# Patient Record
Sex: FEMALE | Race: WHITE | NOT HISPANIC OR LATINO | Employment: OTHER | URBAN - METROPOLITAN AREA
[De-identification: names, ages, dates, MRNs, and addresses within clinical notes are randomized per-mention and may not be internally consistent; named-entity substitution may affect disease eponyms.]

---

## 2023-10-18 ENCOUNTER — APPOINTMENT (EMERGENCY)
Dept: RADIOLOGY | Facility: HOSPITAL | Age: 63
DRG: 419 | End: 2023-10-18
Payer: COMMERCIAL

## 2023-10-18 ENCOUNTER — HOSPITAL ENCOUNTER (INPATIENT)
Facility: HOSPITAL | Age: 63
LOS: 2 days | Discharge: HOME | DRG: 419 | End: 2023-10-20
Attending: EMERGENCY MEDICINE | Admitting: SURGERY
Payer: COMMERCIAL

## 2023-10-18 DIAGNOSIS — K80.00 ACUTE CALCULOUS CHOLECYSTITIS: ICD-10-CM

## 2023-10-18 DIAGNOSIS — R10.11 ACUTE ABDOMINAL PAIN IN RIGHT UPPER QUADRANT: Primary | ICD-10-CM

## 2023-10-18 DIAGNOSIS — K80.20 CHOLELITHIASIS: ICD-10-CM

## 2023-10-18 LAB
2HR DELTA HS TROPONIN: -1 NG/L
ALBUMIN SERPL BCP-MCNC: 4.5 G/DL (ref 3.5–5)
ALP SERPL-CCNC: 99 U/L (ref 34–104)
ALT SERPL W P-5'-P-CCNC: 13 U/L (ref 7–52)
ANION GAP SERPL CALCULATED.3IONS-SCNC: 6 MMOL/L
AST SERPL W P-5'-P-CCNC: 13 U/L (ref 13–39)
ATRIAL RATE: 61 BPM
BACTERIA UR QL AUTO: ABNORMAL /HPF
BASOPHILS # BLD AUTO: 0.03 THOUSANDS/ÂΜL (ref 0–0.1)
BASOPHILS NFR BLD AUTO: 0 % (ref 0–1)
BILIRUB DIRECT SERPL-MCNC: 0.07 MG/DL (ref 0–0.2)
BILIRUB SERPL-MCNC: 0.44 MG/DL (ref 0.2–1)
BILIRUB UR QL STRIP: NEGATIVE
BUN SERPL-MCNC: 13 MG/DL (ref 5–25)
CALCIUM SERPL-MCNC: 9.9 MG/DL (ref 8.4–10.2)
CARDIAC TROPONIN I PNL SERPL HS: 2 NG/L
CARDIAC TROPONIN I PNL SERPL HS: 3 NG/L
CHLORIDE SERPL-SCNC: 107 MMOL/L (ref 96–108)
CLARITY UR: CLEAR
CO2 SERPL-SCNC: 27 MMOL/L (ref 21–32)
COLOR UR: YELLOW
CREAT SERPL-MCNC: 0.75 MG/DL (ref 0.6–1.3)
EOSINOPHIL # BLD AUTO: 0.11 THOUSAND/ÂΜL (ref 0–0.61)
EOSINOPHIL NFR BLD AUTO: 2 % (ref 0–6)
ERYTHROCYTE [DISTWIDTH] IN BLOOD BY AUTOMATED COUNT: 14 % (ref 11.6–15.1)
GFR SERPL CREATININE-BSD FRML MDRD: 85 ML/MIN/1.73SQ M
GLUCOSE SERPL-MCNC: 128 MG/DL (ref 65–140)
GLUCOSE UR STRIP-MCNC: NEGATIVE MG/DL
HCT VFR BLD AUTO: 39.1 % (ref 34.8–46.1)
HGB BLD-MCNC: 13.2 G/DL (ref 11.5–15.4)
HGB UR QL STRIP.AUTO: ABNORMAL
IMM GRANULOCYTES # BLD AUTO: 0.01 THOUSAND/UL (ref 0–0.2)
IMM GRANULOCYTES NFR BLD AUTO: 0 % (ref 0–2)
KETONES UR STRIP-MCNC: NEGATIVE MG/DL
LEUKOCYTE ESTERASE UR QL STRIP: ABNORMAL
LIPASE SERPL-CCNC: 31 U/L (ref 11–82)
LYMPHOCYTES # BLD AUTO: 1.27 THOUSANDS/ÂΜL (ref 0.6–4.47)
LYMPHOCYTES NFR BLD AUTO: 19 % (ref 14–44)
MCH RBC QN AUTO: 33.3 PG (ref 26.8–34.3)
MCHC RBC AUTO-ENTMCNC: 33.8 G/DL (ref 31.4–37.4)
MCV RBC AUTO: 99 FL (ref 82–98)
MONOCYTES # BLD AUTO: 0.45 THOUSAND/ÂΜL (ref 0.17–1.22)
MONOCYTES NFR BLD AUTO: 7 % (ref 4–12)
MUCOUS THREADS UR QL AUTO: ABNORMAL
NEUTROPHILS # BLD AUTO: 4.85 THOUSANDS/ÂΜL (ref 1.85–7.62)
NEUTS SEG NFR BLD AUTO: 72 % (ref 43–75)
NITRITE UR QL STRIP: NEGATIVE
NON-SQ EPI CELLS URNS QL MICRO: ABNORMAL /HPF
NRBC BLD AUTO-RTO: 0 /100 WBCS
P AXIS: 39 DEGREES
PH UR STRIP.AUTO: 6 [PH] (ref 4.5–8)
PLATELET # BLD AUTO: 200 THOUSANDS/UL (ref 149–390)
PMV BLD AUTO: 10.9 FL (ref 8.9–12.7)
POTASSIUM SERPL-SCNC: 4.2 MMOL/L (ref 3.5–5.3)
PR INTERVAL: 138 MS
PROT SERPL-MCNC: 7.4 G/DL (ref 6.4–8.4)
PROT UR STRIP-MCNC: NEGATIVE MG/DL
QRS AXIS: 18 DEGREES
QRSD INTERVAL: 84 MS
QT INTERVAL: 434 MS
QTC INTERVAL: 436 MS
RBC # BLD AUTO: 3.96 MILLION/UL (ref 3.81–5.12)
RBC #/AREA URNS AUTO: ABNORMAL /HPF
SODIUM SERPL-SCNC: 140 MMOL/L (ref 135–147)
SP GR UR STRIP.AUTO: >=1.03 (ref 1–1.03)
T WAVE AXIS: 27 DEGREES
UROBILINOGEN UR QL STRIP.AUTO: 0.2 E.U./DL
VENTRICULAR RATE: 61 BPM
WBC # BLD AUTO: 6.72 THOUSAND/UL (ref 4.31–10.16)
WBC #/AREA URNS AUTO: ABNORMAL /HPF

## 2023-10-18 PROCEDURE — 93010 ELECTROCARDIOGRAM REPORT: CPT | Performed by: INTERNAL MEDICINE

## 2023-10-18 PROCEDURE — 83690 ASSAY OF LIPASE: CPT

## 2023-10-18 PROCEDURE — 96375 TX/PRO/DX INJ NEW DRUG ADDON: CPT

## 2023-10-18 PROCEDURE — 99223 1ST HOSP IP/OBS HIGH 75: CPT | Performed by: SURGERY

## 2023-10-18 PROCEDURE — 96361 HYDRATE IV INFUSION ADD-ON: CPT

## 2023-10-18 PROCEDURE — 71046 X-RAY EXAM CHEST 2 VIEWS: CPT

## 2023-10-18 PROCEDURE — 99285 EMERGENCY DEPT VISIT HI MDM: CPT | Performed by: EMERGENCY MEDICINE

## 2023-10-18 PROCEDURE — G1004 CDSM NDSC: HCPCS

## 2023-10-18 PROCEDURE — 99285 EMERGENCY DEPT VISIT HI MDM: CPT

## 2023-10-18 PROCEDURE — 74177 CT ABD & PELVIS W/CONTRAST: CPT

## 2023-10-18 PROCEDURE — 84484 ASSAY OF TROPONIN QUANT: CPT

## 2023-10-18 PROCEDURE — 96374 THER/PROPH/DIAG INJ IV PUSH: CPT

## 2023-10-18 PROCEDURE — 81001 URINALYSIS AUTO W/SCOPE: CPT

## 2023-10-18 PROCEDURE — 36415 COLL VENOUS BLD VENIPUNCTURE: CPT

## 2023-10-18 PROCEDURE — 80076 HEPATIC FUNCTION PANEL: CPT

## 2023-10-18 PROCEDURE — 80048 BASIC METABOLIC PNL TOTAL CA: CPT

## 2023-10-18 PROCEDURE — 85025 COMPLETE CBC W/AUTO DIFF WBC: CPT

## 2023-10-18 PROCEDURE — 93005 ELECTROCARDIOGRAM TRACING: CPT

## 2023-10-18 RX ORDER — FAMOTIDINE 20 MG/1
20 TABLET, FILM COATED ORAL DAILY
Status: DISCONTINUED | OUTPATIENT
Start: 2023-10-19 | End: 2023-10-20 | Stop reason: HOSPADM

## 2023-10-18 RX ORDER — ONDANSETRON 2 MG/ML
4 INJECTION INTRAMUSCULAR; INTRAVENOUS ONCE
Status: COMPLETED | OUTPATIENT
Start: 2023-10-18 | End: 2023-10-18

## 2023-10-18 RX ORDER — OXYCODONE HYDROCHLORIDE 10 MG/1
10 TABLET ORAL EVERY 4 HOURS PRN
Status: DISCONTINUED | OUTPATIENT
Start: 2023-10-18 | End: 2023-10-20 | Stop reason: HOSPADM

## 2023-10-18 RX ORDER — AMITRIPTYLINE HYDROCHLORIDE 10 MG/1
10 TABLET, FILM COATED ORAL
Status: DISCONTINUED | OUTPATIENT
Start: 2023-10-18 | End: 2023-10-20 | Stop reason: HOSPADM

## 2023-10-18 RX ORDER — HYDROMORPHONE HCL/PF 1 MG/ML
0.5 SYRINGE (ML) INJECTION ONCE
Status: COMPLETED | OUTPATIENT
Start: 2023-10-18 | End: 2023-10-18

## 2023-10-18 RX ORDER — ACETAMINOPHEN 325 MG/1
650 TABLET ORAL EVERY 6 HOURS PRN
Status: DISCONTINUED | OUTPATIENT
Start: 2023-10-18 | End: 2023-10-20 | Stop reason: HOSPADM

## 2023-10-18 RX ORDER — SODIUM CHLORIDE, SODIUM GLUCONATE, SODIUM ACETATE, POTASSIUM CHLORIDE, MAGNESIUM CHLORIDE, SODIUM PHOSPHATE, DIBASIC, AND POTASSIUM PHOSPHATE .53; .5; .37; .037; .03; .012; .00082 G/100ML; G/100ML; G/100ML; G/100ML; G/100ML; G/100ML; G/100ML
75 INJECTION, SOLUTION INTRAVENOUS CONTINUOUS
Status: DISCONTINUED | OUTPATIENT
Start: 2023-10-19 | End: 2023-10-20

## 2023-10-18 RX ORDER — HYDROMORPHONE HCL/PF 1 MG/ML
0.5 SYRINGE (ML) INJECTION EVERY 4 HOURS PRN
Status: DISCONTINUED | OUTPATIENT
Start: 2023-10-18 | End: 2023-10-20

## 2023-10-18 RX ORDER — ESCITALOPRAM OXALATE 20 MG/1
20 TABLET ORAL DAILY
COMMUNITY

## 2023-10-18 RX ORDER — CLINDAMYCIN PHOSPHATE 600 MG/50ML
600 INJECTION INTRAVENOUS EVERY 8 HOURS
Status: DISCONTINUED | OUTPATIENT
Start: 2023-10-18 | End: 2023-10-19

## 2023-10-18 RX ORDER — KETOROLAC TROMETHAMINE 30 MG/ML
15 INJECTION, SOLUTION INTRAMUSCULAR; INTRAVENOUS ONCE
Status: COMPLETED | OUTPATIENT
Start: 2023-10-18 | End: 2023-10-18

## 2023-10-18 RX ORDER — ROSUVASTATIN CALCIUM 20 MG/1
20 TABLET, COATED ORAL DAILY
COMMUNITY

## 2023-10-18 RX ORDER — FAMOTIDINE 20 MG/1
20 TABLET, FILM COATED ORAL DAILY
COMMUNITY

## 2023-10-18 RX ORDER — ESCITALOPRAM OXALATE 20 MG/1
20 TABLET ORAL DAILY
Status: DISCONTINUED | OUTPATIENT
Start: 2023-10-19 | End: 2023-10-20 | Stop reason: HOSPADM

## 2023-10-18 RX ORDER — AMITRIPTYLINE HYDROCHLORIDE 10 MG/1
10 TABLET, FILM COATED ORAL
COMMUNITY

## 2023-10-18 RX ORDER — LISINOPRIL 30 MG/1
30 TABLET ORAL DAILY
COMMUNITY

## 2023-10-18 RX ORDER — METOPROLOL SUCCINATE 50 MG/1
50 TABLET, EXTENDED RELEASE ORAL DAILY
COMMUNITY

## 2023-10-18 RX ORDER — METOPROLOL SUCCINATE 50 MG/1
50 TABLET, EXTENDED RELEASE ORAL DAILY
Status: DISCONTINUED | OUTPATIENT
Start: 2023-10-19 | End: 2023-10-20 | Stop reason: HOSPADM

## 2023-10-18 RX ORDER — ONDANSETRON 2 MG/ML
4 INJECTION INTRAMUSCULAR; INTRAVENOUS EVERY 6 HOURS PRN
Status: DISCONTINUED | OUTPATIENT
Start: 2023-10-18 | End: 2023-10-20

## 2023-10-18 RX ORDER — OXYCODONE HYDROCHLORIDE 5 MG/1
5 TABLET ORAL EVERY 4 HOURS PRN
Status: DISCONTINUED | OUTPATIENT
Start: 2023-10-18 | End: 2023-10-20 | Stop reason: HOSPADM

## 2023-10-18 RX ORDER — HEPARIN SODIUM 5000 [USP'U]/ML
5000 INJECTION, SOLUTION INTRAVENOUS; SUBCUTANEOUS EVERY 8 HOURS SCHEDULED
Status: DISCONTINUED | OUTPATIENT
Start: 2023-10-18 | End: 2023-10-20 | Stop reason: HOSPADM

## 2023-10-18 RX ORDER — METOCLOPRAMIDE HYDROCHLORIDE 5 MG/ML
10 INJECTION INTRAMUSCULAR; INTRAVENOUS ONCE
Status: COMPLETED | OUTPATIENT
Start: 2023-10-18 | End: 2023-10-18

## 2023-10-18 RX ADMIN — HEPARIN SODIUM 5000 UNITS: 5000 INJECTION INTRAVENOUS; SUBCUTANEOUS at 16:54

## 2023-10-18 RX ADMIN — CLINDAMYCIN PHOSPHATE 600 MG: 600 INJECTION, SOLUTION INTRAVENOUS at 21:11

## 2023-10-18 RX ADMIN — ONDANSETRON 4 MG: 2 INJECTION INTRAMUSCULAR; INTRAVENOUS at 11:35

## 2023-10-18 RX ADMIN — KETOROLAC TROMETHAMINE 15 MG: 30 INJECTION, SOLUTION INTRAMUSCULAR; INTRAVENOUS at 11:31

## 2023-10-18 RX ADMIN — AMITRIPTYLINE HYDROCHLORIDE 10 MG: 10 TABLET, FILM COATED ORAL at 21:51

## 2023-10-18 RX ADMIN — HYDROMORPHONE HYDROCHLORIDE 0.5 MG: 1 INJECTION, SOLUTION INTRAMUSCULAR; INTRAVENOUS; SUBCUTANEOUS at 14:42

## 2023-10-18 RX ADMIN — SODIUM CHLORIDE 1000 ML: 0.9 INJECTION, SOLUTION INTRAVENOUS at 11:22

## 2023-10-18 RX ADMIN — IOHEXOL 50 ML: 240 INJECTION, SOLUTION INTRATHECAL; INTRAVASCULAR; INTRAVENOUS; ORAL at 12:05

## 2023-10-18 RX ADMIN — METOCLOPRAMIDE HYDROCHLORIDE 10 MG: 5 INJECTION INTRAMUSCULAR; INTRAVENOUS at 15:06

## 2023-10-18 RX ADMIN — IOHEXOL 100 ML: 350 INJECTION, SOLUTION INTRAVENOUS at 13:49

## 2023-10-18 NOTE — ED ATTENDING ATTESTATION
10/18/2023  Caity Anthony DO, saw and evaluated the patient. I have discussed the patient with the resident/non-physician practitioner and agree with the resident's/non-physician practitioner's findings, Plan of Care, and MDM as documented in the resident's/non-physician practitioner's note, except where noted. All available labs and Radiology studies were reviewed. I was present for key portions of any procedure(s) performed by the resident/non-physician practitioner and I was immediately available to provide assistance. At this point I agree with the current assessment done in the Emergency Department. I have conducted an independent evaluation of this patient a history and physical is as follows:    23-year-old female presents for complaint of right upper quadrant abdominal pain associated with right scapular pain and nausea. Patient has had symptoms intermittently for weeks for which she was being evaluated by a surgeon at [facility].  She has been diagnosed with cholelithiasis and was being scheduled for surgery. However, symptoms worsened and are now constant. She was seen at [facility] yesterday and had a confirming right upper quadrant ultrasound demonstrating cholelithiasis without surrounding fluid. However, she was diagnosed with shoulder musculoskeletal spasm and given Valium which she states has only made her feel loopy. No hematochezia, melena or hematemesis. No change in symptoms w/BM or voiding. No recent travel or similar sick contacts. Denies f/c, CP, SOB, v/d. 12 system ROS o/w negative. PE: NAD, appears comfortable, alert; PERRL, EOMI; MMM, no posterior oropharyngeal exudate, edema or erythema; HRR, no murmur; lungs CTA w/o w/r/r, POx 96% on RA (nl); abdomen s/nd, TTP in RUQ without r/g/r, (-) Rovsing's, nl BS in all 4 quadrant; (-) LE edema or calf TTP, FROM extremities x4; skin p/w/d; CNs GI/NF, oriented.     MDM/DDx: RUQ abdominal pain/R shoulder pain - symptomatic cholelithiasis, no clinical symptoms of acute cholecystitis, doubt other GI cause, atypical cardiac cause or pulmonary cause. I independently reviewed and interpreted ordered labs from this encounter. A/P: Will check CT a/p, abdominal labs, treat symptoms, reevaluate for further work up and disposition.     ED Course         Critical Care Time  Procedures

## 2023-10-18 NOTE — H&P
H&P - Red Surgery  : Red [facility] Resident [facility]  Carmita Payne 59 y.o. female MRN: [id number]  Unit/Bed#: ED 05 Encounter: [id number]    Assessment:  59 y.o. female with symptomatic cholelithiasis, acute cholecystitis    Plan:  - Admit to general surgery service  - NPO at midnight  - Laparoscopic cholecystectomy 10/19  - IV antibiotics, discussion pending with pharmacy   - Pain and nausea control PRN  - DVT ppx    HPI:  Maycol Lechuga is a 59 y.o. female with a history of known cholelithiasis, colectomy 2012 for diverticulitis, hypertension, hyperlipidemia, IBS, migraine, LANETTE. Patient scheduled for elective cholecystectomy in November at [facility]. Presented to the ED after 1 week of worsening right upper quadrant and right shoulder pain. First episode was reportedly 2 years ago, only occurring 4 times since then. Pain has been intermittent and the past 6 months, but constant and worsening in the past week. Reports decreased appetite, nausea, episodes of diarrhea, subjective fever, chills, shortness of breath with pain, increased blood pressure above baseline. Denies chest pain or vomiting. Seen yesterday at [facility] ED, at which time right upper quadrant ultrasound was performed, which demonstrated cholelithiasis without pericholecystic edema. Patient was prescribed Valium for possible musculoskeletal pain. On presentation to ED today, hypertensive to 170s over 90s, vitals otherwise within normal limits. Labs unremarkable. CT abdomen pelvis performed demonstrating cholelithiasis with likely mild acute cholecystitis. Physical Exam  Vitals reviewed. Constitutional:       General: She is not in acute distress. Appearance: Normal appearance. She is not ill-appearing or toxic-appearing. HENT:      Head: Normocephalic and atraumatic. Mouth/Throat:      Mouth: Mucous membranes are moist.   Eyes:      Extraocular Movements: Extraocular movements intact. Conjunctiva/sclera: Conjunctivae normal.   Cardiovascular:      Rate and Rhythm: Normal rate. Pulmonary:      Effort: Pulmonary effort is normal. No respiratory distress. Abdominal:      General: There is no distension. Palpations: Abdomen is soft. Tenderness: There is abdominal tenderness (RUQ, right flank). There is no guarding or rebound. Musculoskeletal:      Right lower leg: No edema. Left lower leg: No edema. Skin:     General: Skin is warm and dry. Neurological:      Mental Status: She is alert and oriented to person, place, and time. Review of Systems   Constitutional:  Positive for appetite change, chills and fever. Respiratory:  Negative for shortness of breath. Cardiovascular:  Negative for chest pain. Gastrointestinal:  Positive for abdominal pain, diarrhea and nausea. Negative for abdominal distention and vomiting. Genitourinary:  Negative for difficulty urinating. Musculoskeletal:  Positive for arthralgias (s/p bilateral knee replacements). Allergic/Immunologic: Positive for environmental allergies. Neurological:  Positive for headaches. Psychiatric/Behavioral: Negative.      Denies additional complaints    Objective       No intake or output data in the 24 hours ending 10/18/23 1452    First Vitals:   Blood Pressure: 151/97 (10/18/23 1050)  Pulse: 68 (10/18/23 1050)  Temperature: 97.7 °F (36.5 °C) (10/18/23 1050)  Temp Source: Temporal (10/18/23 1050)  Respirations: 18 (10/18/23 1050)  SpO2: 98 % (10/18/23 1050)    Current Vitals:   Blood Pressure: (!) 175/81 (10/18/23 1300)  Pulse: 60 (10/18/23 1300)  Temperature: 97.7 °F (36.5 °C) (10/18/23 1050)  Temp Source: Temporal (10/18/23 1050)  Respirations: 18 (10/18/23 1300)  SpO2: 96 % (10/18/23 1300)    Invasive Devices       Peripheral Intravenous Line  Duration             Peripheral IV 10/18/23 Proximal;Right;Ventral (anterior) Forearm <1 day                    Imaging: I have personally reviewed pertinent reports. CT abdomen pelvis with contrast    Result Date: 10/18/2023  Impression: 1. Cholelithiasis with likely mild acute cholecystitis given acute right upper quadrant pain and nausea. 2.  Moderate hiatal hernia. 3.  Indeterminate 1.2 cm right adrenal nodule. Although its imaging features are indeterminate, it does not have suspicious imaging features (heterogeneity, necrosis, irregular margins), therefore this is likely benign, and can be followed by non-contrast abdomen CT or MRI in 12 months. Please note that for adrenal nodule > 1cm,  biochemical evaluation is suggested to rule out functioning adenoma. 4.  2.9 cm cyst in the left ovary with lobulated borders. A follow-up nonemergent ultrasound is recommended given postmenopausal status. Adrenal recommendation based on institutional consensus and Journal of [address] [place] [address] of Radiology 2017;[id number] The study was marked in [facility] for immediate notification. Workstation performed: [id number]       EKG, Pathology, and Other Studies: I have personally reviewed pertinent reports. VTE Pharmacologic Prophylaxis: Heparin  VTE Mechanical Prophylaxis: sequential compression device    Historical Information   Past Medical History:   Diagnosis Date    Gallstones     High cholesterol     Hypertension     IBS (irritable bowel syndrome)     Migraine     Skin cancer     Sleep apnea      History reviewed. No pertinent surgical history. Social History   Social History     Substance and Sexual Activity   Alcohol Use None     Social History     Substance and Sexual Activity   Drug Use Not on file     Social History     Tobacco Use   Smoking Status Not on file   Smokeless Tobacco Not on file     History reviewed. No pertinent family history. Meds/Allergies   all current active meds have been reviewed, current meds:   No current facility-administered medications for this encounter.     and PTA meds:   None     Allergies   Allergen Reactions Cephalosporins Anaphylaxis    Penicillins Other (See Comments)     unknown    Bactrim [Sulfamethoxazole-Trimethoprim] Rash    Sulfa Antibiotics Rash       Lab Results: I have personally reviewed pertinent lab results. , CBC:   Lab Results   Component Value Date    WBC 6.72 10/18/2023    HGB 13.2 10/18/2023    HCT 39.1 10/18/2023    MCV 99 (H) 10/18/2023     10/18/2023    RBC 3.96 10/18/2023    MCH 33.3 10/18/2023    MCHC 33.8 10/18/2023    RDW 14.0 10/18/2023    MPV 10.9 10/18/2023    NRBC 0 10/18/2023   , CMP:   Lab Results   Component Value Date    SODIUM 140 10/18/2023    K 4.2 10/18/2023     10/18/2023    CO2 27 10/18/2023    BUN 13 10/18/2023    CREATININE 0.75 10/18/2023    CALCIUM 9.9 10/18/2023    AST 13 10/18/2023    ALT 13 10/18/2023    ALKPHOS 99 10/18/2023    EGFR 85 10/18/2023       Counseling / Coordination of Care  Total floor / unit time spent today 25 minutes. Greater than 50% of total time was spent with the patient and / or family counseling and / or coordination of care.     Consults    ---  Leena Castillo MD  General Surgery PGY-I

## 2023-10-18 NOTE — PLAN OF CARE
Problem: PAIN - ADULT  Goal: Verbalizes/displays adequate comfort level or baseline comfort level  Description: Interventions:  - Encourage patient to monitor pain and request assistance  - Assess pain using appropriate pain scale  - Administer analgesics based on type and severity of pain and evaluate response  - Implement non-pharmacological measures as appropriate and evaluate response  - Consider cultural and social influences on pain and pain management  - Notify physician/advanced practitioner if interventions unsuccessful or patient reports new pain  Outcome: Progressing     Problem: INFECTION - ADULT  Goal: Absence or prevention of progression during hospitalization  Description: INTERVENTIONS:  - Assess and monitor for signs and symptoms of infection  - Monitor lab/diagnostic results  - Monitor all insertion sites, i.e. indwelling lines, tubes, and drains  - Monitor endotracheal if appropriate and nasal secretions for changes in amount and color  - [place] appropriate cooling/warming therapies per order  - Administer medications as ordered  - Instruct and encourage patient and family to use good hand hygiene technique  - Identify and instruct in appropriate isolation precautions for identified infection/condition  Outcome: Progressing  Goal: Absence of fever/infection during neutropenic period  Description: INTERVENTIONS:  - Monitor WBC    Outcome: Progressing     Problem: SAFETY ADULT  Goal: Patient will remain free of falls  Description: INTERVENTIONS:  - Educate patient/family on patient safety including physical limitations  - Instruct patient to call for assistance with activity   - Consult OT/PT to assist with strengthening/mobility   - Keep Call bell within reach  - Keep bed low and locked with side rails adjusted as appropriate  - Keep care items and personal belongings within reach  - Initiate and maintain comfort rounds  - Make Fall Risk Sign visible to staff  - Offer Toileting every 2 Hours, in advance of need  - Initiate/Maintain alarm  - Obtain necessary fall risk management equipment  - Apply yellow socks and bracelet for high fall risk patients  - Consider moving patient to room near nurses station  Outcome: Progressing  Goal: Maintain or return to baseline ADL function  Description: INTERVENTIONS:  -  Assess patient's ability to carry out ADLs; assess patient's baseline for ADL function and identify physical deficits which impact ability to perform ADLs (bathing, care of mouth/teeth, toileting, grooming, dressing, etc.)  - Assess/evaluate cause of self-care deficits   - Assess range of motion  - Assess patient's mobility; develop plan if impaired  - Assess patient's need for assistive devices and provide as appropriate  - Encourage maximum independence but intervene and supervise when necessary  - Involve family in performance of ADLs  - Assess for home care needs following discharge   - Consider OT consult to assist with ADL evaluation and planning for discharge  - Provide patient education as appropriate  Outcome: Progressing  Goal: Maintains/Returns to pre admission functional level  Description: INTERVENTIONS:  - Perform BMAT or MOVE assessment daily.   - Set and communicate daily mobility goal to care team and patient/family/caregiver. - Collaborate with rehabilitation services on mobility goals if consulted  - Perform Range of Motion 2 times a day. - Reposition patient every 2 hours.   - Dangle patient 2 times a day  - Stand patient 2 times a day  - Ambulate patient 2 times a day  - Out of bed to chair 2 times a day   - Out of bed for meals 2 times a day  - Out of bed for toileting  - Record patient progress and toleration of activity level   Outcome: Progressing     Problem: DISCHARGE PLANNING  Goal: Discharge to home or other facility with appropriate resources  Description: INTERVENTIONS:  - Identify barriers to discharge w/patient and caregiver  - Arrange for needed discharge resources and transportation as appropriate  - Identify discharge learning needs (meds, wound care, etc.)  - Arrange for interpretive services to assist at discharge as needed  - Refer to Case Management Department for coordinating discharge planning if the patient needs post-hospital services based on physician/advanced practitioner order or complex needs related to functional status, cognitive ability, or social support system  Outcome: Progressing     Problem: Knowledge Deficit  Goal: Patient/family/caregiver demonstrates understanding of disease process, treatment plan, medications, and discharge instructions  Description: Complete learning assessment and assess knowledge base.   Interventions:  - Provide teaching at level of understanding  - Provide teaching via preferred learning methods  Outcome: Progressing

## 2023-10-18 NOTE — ED PROVIDER NOTES
History  Chief Complaint   Patient presents with    Shoulder Pain     Is having right shoulder pain and abd pain and nausea. has known gallstones and is scheduled for surgery early November. 59 y.o F w/ HTN, HLD, cholelithiasis, presents to the ED due to persistent RUQ pain with radiation into the R shoulder. She has been worked up for this recently at [address] [place] [address] Rd and has been scheduled in November for a cholecystectomy. She was seen yesterday at an outside ED, had negative cardiac workup and was discharged with valium. She denies any new changes, just notes that the RUQ pain is now constant with associated nausea but no vomiting. She ntoes the R shoulder pain has been common through the exacerbations in the past. No SOB, F/C, diarrhea, or other complaints. Prior to Admission Medications   Prescriptions Last Dose Informant Patient Reported? Taking?   amitriptyline (ELAVIL) 10 mg tablet   Yes Yes   Sig: Take 10 mg by mouth daily at bedtime   escitalopram (LEXAPRO) 20 mg tablet   Yes Yes   Sig: Take 20 mg by mouth daily   famotidine (PEPCID) 20 mg tablet   Yes Yes   Sig: Take 20 mg by mouth daily   lisinopril (ZESTRIL) 30 mg tablet   Yes Yes   Sig: Take 30 mg by mouth daily   metoprolol succinate (TOPROL-XL) 50 mg 24 hr tablet   Yes Yes   Sig: Take 50 mg by mouth daily   rosuvastatin (CRESTOR) 20 MG tablet   Yes Yes   Sig: Take 20 mg by mouth daily      Facility-Administered Medications: None       Past Medical History:   Diagnosis Date    Gallstones     High cholesterol     Hypertension     IBS (irritable bowel syndrome)     Migraine     Skin cancer     Sleep apnea        Past Surgical History:   Procedure Laterality Date    CHOLECYSTECTOMY LAPAROSCOPIC N/A 10/19/2023    Procedure: CHOLECYSTECTOMY LAPAROSCOPIC;  Surgeon: Shannon Law DO;  Location: [facility];  Service: General       History reviewed. No pertinent family history.   I have reviewed and agree with the history as documented. E-Cigarette/Vaping     E-Cigarette/Vaping Substances     Social History     Tobacco Use    Smoking status: Never    Smokeless tobacco: Never        Review of Systems   All other systems reviewed and are negative. Physical Exam  ED Triage Vitals   Temperature Pulse Respirations Blood Pressure SpO2   10/18/23 1050 10/18/23 1050 10/18/23 1050 10/18/23 1050 10/18/23 1050   97.7 °F (36.5 °C) 68 18 151/97 98 %      Temp Source Heart Rate Source Patient Position - Orthostatic VS BP Location FiO2 (%)   10/18/23 1050 10/18/23 1050 10/18/23 1115 10/18/23 1050 --   Temporal Monitor Lying Right arm       Pain Score       10/18/23 1050       9             Orthostatic Vital Signs  Vitals:    10/19/23 1827 10/19/23 1930 10/19/23 2152 10/20/23 0756   BP: 146/85 151/92 147/76 141/77   Pulse: 77 82 75    Patient Position - Orthostatic VS:           Physical Exam  Vitals and nursing note reviewed. Constitutional:       General: She is not in acute distress. Appearance: She is well-developed. HENT:      Head: Normocephalic and atraumatic. Eyes:      Conjunctiva/sclera: Conjunctivae normal.   Cardiovascular:      Rate and Rhythm: Normal rate and regular rhythm. Heart sounds: No murmur heard. Pulmonary:      Effort: Pulmonary effort is normal. No respiratory distress. Breath sounds: Normal breath sounds. Abdominal:      Palpations: Abdomen is soft. Tenderness: There is abdominal tenderness (RUQ). Musculoskeletal:         General: No swelling. Cervical back: Neck supple. Skin:     General: Skin is warm and dry. Capillary Refill: Capillary refill takes less than 2 seconds. Neurological:      General: No focal deficit present. Mental Status: She is alert.    Psychiatric:         Mood and Affect: Mood normal.         ED Medications  Medications   sodium chloride 0.9 % bolus 1,000 mL (0 mL Intravenous Stopped 10/18/23 1222)   ketorolac (TORADOL) injection 15 mg (15 mg Intravenous Given 10/18/23 1131)   ondansetron (ZOFRAN) injection 4 mg (4 mg Intravenous Given 10/18/23 1135)   iohexol (OMNIPAQUE) 240 MG/ML solution 50 mL (50 mL Oral Given 10/18/23 1205)   iohexol (OMNIPAQUE) 350 MG/ML injection (SINGLE-DOSE) 100 mL (100 mL Intravenous Given 10/18/23 1349)   HYDROmorphone (DILAUDID) injection 0.5 mg (0.5 mg Intravenous Given 10/18/23 1442)   metoclopramide (REGLAN) injection 10 mg (10 mg Intravenous Given 10/18/23 1506)   ondansetron (ZOFRAN) injection 4 mg (4 mg Intravenous Given 10/19/23 1731)   ketorolac (TORADOL) injection 15 mg (15 mg Intravenous Given 10/20/23 0026)   ketorolac (TORADOL) injection 15 mg (15 mg Intravenous Given 10/20/23 0824)       Diagnostic Studies  Results Reviewed       Procedure Component Value Units Date/Time    HS Troponin I 2hr [[id number]]  (Normal) Collected: 10/18/23 1405    Lab Status: Final result Specimen: Blood from Line, Venous Updated: 10/18/23 1443     hs TnI 2hr 2 ng/L      Delta 2hr hsTnI -1 ng/L     Urine Microscopic [[id number]]  (Abnormal) Collected: 10/18/23 1214    Lab Status: Final result Specimen: Urine, Clean Catch Updated: 10/18/23 1329     RBC, UA None Seen /hpf      WBC, UA 1-2 /hpf      Epithelial Cells Occasional /hpf      Bacteria, UA Occasional /hpf      MUCUS THREADS Occasional    Hepatic function panel [[id number]]  (Normal) Collected: 10/18/23 1120    Lab Status: Final result Specimen: Blood from Arm, Right Updated: 10/18/23 1221     Total Bilirubin 0.44 mg/dL      Bilirubin, Direct 0.07 mg/dL      Alkaline Phosphatase 99 U/L      AST 13 U/L      ALT 13 U/L      Total Protein 7.4 g/dL      Albumin 4.5 g/dL     Basic metabolic panel [[id number]] Collected: 10/18/23 1120    Lab Status: Final result Specimen: Blood from Arm, Right Updated: 10/18/23 1221     Sodium 140 mmol/L      Potassium 4.2 mmol/L      Chloride 107 mmol/L      CO2 27 mmol/L      ANION GAP 6 mmol/L      BUN 13 mg/dL      Creatinine 0.75 mg/dL      Glucose 128 mg/dL      Calcium 9.9 mg/dL      eGFR 85 ml/min/1.73sq m     Narrative:      National Kidney Disease Foundation guidelines for Chronic Kidney Disease (CKD):     Stage 1 with normal or high GFR (GFR > 90 mL/min/1.73 square meters)    Stage 2 Mild CKD (GFR = 60-89 mL/min/1.73 square meters)    Stage 3A Moderate CKD (GFR = 45-59 mL/min/1.73 square meters)    Stage 3B Moderate CKD (GFR = 30-44 mL/min/1.73 square meters)    Stage 4 Severe CKD (GFR = 15-29 mL/min/1.73 square meters)    Stage 5 End Stage CKD (GFR <15 mL/min/1.73 square meters)  Note: GFR calculation is accurate only with a steady state creatinine    Lipase [[id number]]  (Normal) Collected: 10/18/23 1120    Lab Status: Final result Specimen: Blood from Arm, Right Updated: 10/18/23 1221     Lipase 31 u/L     Urine Macroscopic, POC [[id number]]  (Abnormal) Collected: 10/18/23 1214    Lab Status: Final result Specimen: Urine Updated: 10/18/23 1215     Color, UA Yellow     Clarity, UA Clear     pH, UA 6.0     Leukocytes, UA Trace     Nitrite, UA Negative     Protein, UA Negative mg/dl      Glucose, UA Negative mg/dl      Ketones, UA Negative mg/dl      Urobilinogen, UA 0.2 E.U./dl      Bilirubin, UA Negative     Occult Blood, UA Trace     Specific Gravity, UA >=1.030    Narrative:      CLINITEK RESULT    HS Troponin 0hr (reflex protocol) [[id number]]  (Normal) Collected: 10/18/23 1120    Lab Status: Final result Specimen: Blood from Arm, Right Updated: 10/18/23 1214     hs TnI 0hr 3 ng/L     CBC and differential [[id number]]  (Abnormal) Collected: 10/18/23 1120    Lab Status: Final result Specimen: Blood from Arm, Right Updated: 10/18/23 1136     WBC 6.72 Thousand/uL      RBC 3.96 Million/uL      Hemoglobin 13.2 g/dL      Hematocrit 39.1 %      MCV 99 fL      MCH 33.3 pg      MCHC 33.8 g/dL      RDW 14.0 %      MPV 10.9 fL      Platelets 768 Thousands/uL      nRBC 0 /100 WBCs      Neutrophils Relative 72 %      Immat GRANS % 0 %      Lymphocytes Relative 19 % Monocytes Relative 7 %      Eosinophils Relative 2 %      Basophils Relative 0 %      Neutrophils Absolute 4.85 Thousands/µL      Immature Grans Absolute 0.01 Thousand/uL      Lymphocytes Absolute 1.27 Thousands/µL      Monocytes Absolute 0.45 Thousand/µL      Eosinophils Absolute 0.11 Thousand/µL      Basophils Absolute 0.03 Thousands/µL                    CT abdomen pelvis with contrast   Final Result by Araceli Morris MD (10/18 3968)      1. Cholelithiasis with likely mild acute cholecystitis given acute right upper quadrant pain and nausea. 2.  Moderate hiatal hernia. 3.  Indeterminate 1.2 cm right adrenal nodule. Although its imaging features are indeterminate, it does not have suspicious imaging features (heterogeneity, necrosis, irregular margins), therefore this is likely benign, and can be followed by    non-contrast abdomen CT or MRI in 12 months. Please note that for adrenal nodule > 1cm,  biochemical evaluation is suggested to rule out functioning adenoma. 4.  2.9 cm cyst in the left ovary with lobulated borders. A follow-up nonemergent ultrasound is recommended given postmenopausal status. Adrenal recommendation based on institutional consensus and Journal of [employer] of Radiology 2017;14:6741-6031      The study was marked in [facility] for immediate notification. Workstation performed: [id number]         XR chest 2 views   Final Result by Washington Mayo MD (10/18 1917)      No acute cardiopulmonary disease.                   Workstation performed: [id number]               Procedures  ECG 12 Lead Documentation Only    Date/Time: 10/18/2023 11:57 AM    Performed by: Michael Kunz MD  Authorized by: Michael Kunz MD    ECG reviewed by me, the ED Provider: yes    Patient location:  ED  Previous ECG:     Previous ECG:  Unavailable  Interpretation:     Interpretation: normal    Rate:     ECG rate assessment: normal    Rhythm:     Rhythm: sinus rhythm    Ectopy: Ectopy: none    QRS:     QRS axis:  Normal    QRS intervals:  Normal  Conduction:     Conduction: normal    ST segments:     ST segments:  Normal        ED Course  ED Course as of 10/24/23 1211   Wed Oct 18, 2023   1442 CT abdomen pelvis with contrast  Results discussed with patient and family. Will discuss with general surgery. 5 Awaiting surgery for admission. Medical Decision Making  59 y.o F presenting with known gall bladder disease presenting with persistent symyptoms. Likely related to biliary cholic vs progression to cholecystitis. Will also r/o ACS as a cause. No signs of DVT. Workup consistent with known GB disease. Discussed with surgery who will admit patient for surgical management. Patient admitted after further symptom control. Amount and/or Complexity of Data Reviewed  Labs: ordered. Radiology: ordered. Decision-making details documented in ED Course. Risk  Prescription drug management. Decision regarding hospitalization.           Disposition  Final diagnoses:   Cholelithiasis   Acute abdominal pain in right upper quadrant     Time reflects when diagnosis was documented in both MDM as applicable and the Disposition within this note       Time User Action Codes Description Comment    10/18/2023  1:37 PM Hollie Blandon Add [K80.20] Cholelithiasis     10/18/2023  4:59 PM Nathan Monae Add [K80.00] Acute calculous cholecystitis     10/19/2023  4:49 PM Betty Riojas Modify [K80.20] Cholelithiasis     10/19/2023  4:49 PM Kim Denton Modify [K80.00] Acute calculous cholecystitis     10/24/2023 12:10 PM Lj Olivarez Add [R10.11] Acute abdominal pain in right upper quadrant     10/24/2023 12:10 PM [place], [address] [K80.20] Cholelithiasis     10/24/2023 12:10 [facility] [address] [R10.11] Acute abdominal pain in right upper quadrant           ED Disposition       ED Disposition   Admit    Condition   Stable    Date/Time   Wed Oct 18, 2023  4:12 PM    Comment   Case was discussed with Surgery and the patient's admission status was agreed to be Admission Status: inpatient status to the service of Dr. Toby Espinoza . Follow-up Information       Follow up With Specialties Details Why Contact Info    Dedrick Fitzgerald MD Internal Medicine Schedule an appointment as soon as possible for a visit in 2 week(s)  [facility] [place] [address]  [address] [place] [address]  [phone]              Discharge Medication List as of 10/20/2023  3:14 PM        START taking these medications    Details   ondansetron (Zofran ODT) 4 mg disintegrating tablet Take 1 tablet (4 mg total) by mouth every 6 (six) hours as needed for nausea or vomiting, Starting Fri 10/20/2023, Normal      oxyCODONE (ROXICODONE) 5 immediate release tablet Take 1 tablet (5 mg total) by mouth every 4 (four) hours as needed for moderate pain for up to 10 days Max Daily Amount: 30 mg, Starting Fri 10/20/2023, Until Mon 10/30/2023 at 2359, Normal           CONTINUE these medications which have NOT CHANGED    Details   amitriptyline (ELAVIL) 10 mg tablet Take 10 mg by mouth daily at bedtime, Historical Med      escitalopram (LEXAPRO) 20 mg tablet Take 20 mg by mouth daily, Historical Med      famotidine (PEPCID) 20 mg tablet Take 20 mg by mouth daily, Historical Med      lisinopril (ZESTRIL) 30 mg tablet Take 30 mg by mouth daily, Historical Med      metoprolol succinate (TOPROL-XL) 50 mg 24 hr tablet Take 50 mg by mouth daily, Historical Med      rosuvastatin (CRESTOR) 20 MG tablet Take 20 mg by mouth daily, Historical Med           Outpatient Discharge Orders   Discharge Diet     Activity as tolerated     No strenuous exercise     Call provider for:  persistent nausea or vomiting     Call provider for:  severe uncontrolled pain     Call provider for:  redness, tenderness, or signs of infection (pain, swelling, redness, odor or green/yellow discharge around incision site)     Call provider for:  difficulty breathing, headache or visual disturbances     Call provider for:  persistent dizziness or light-headedness       PDMP Review         Value Time User    PDMP Reviewed  Yes 10/20/2023  2:31 PM Jo Ann Bettencourt PA-C             ED Provider  Attending physically available and evaluated Carmita Curtis. I managed the patient along with the ED Attending.     Electronically Signed by           Collin Kohler MD  10/19/23 [id number] Jonh Wood DO  10/24/23 1211

## 2023-10-19 ENCOUNTER — ANESTHESIA EVENT (INPATIENT)
Dept: PERIOP | Facility: HOSPITAL | Age: 63
DRG: 419 | End: 2023-10-19
Payer: COMMERCIAL

## 2023-10-19 ENCOUNTER — ANESTHESIA (INPATIENT)
Dept: PERIOP | Facility: HOSPITAL | Age: 63
DRG: 419 | End: 2023-10-19
Payer: COMMERCIAL

## 2023-10-19 PROBLEM — K80.20 CALCULUS OF GALLBLADDER: Status: ACTIVE | Noted: 2023-06-07

## 2023-10-19 PROBLEM — K64.9 HEMORRHOIDS: Status: ACTIVE | Noted: 2023-03-08

## 2023-10-19 PROBLEM — R10.10 UPPER ABDOMINAL PAIN: Status: ACTIVE | Noted: 2023-03-08

## 2023-10-19 PROBLEM — K21.9 GASTRO-ESOPHAGEAL REFLUX DISEASE WITHOUT ESOPHAGITIS: Status: ACTIVE | Noted: 2021-12-22

## 2023-10-19 PROBLEM — K58.0 IRRITABLE BOWEL SYNDROME WITH DIARRHEA: Status: ACTIVE | Noted: 2021-12-22

## 2023-10-19 PROBLEM — Z86.0100 HISTORY OF COLONIC POLYPS: Status: ACTIVE | Noted: 2021-12-22

## 2023-10-19 PROBLEM — Z86.010 HISTORY OF COLONIC POLYPS: Status: ACTIVE | Noted: 2021-12-22

## 2023-10-19 PROCEDURE — 99024 POSTOP FOLLOW-UP VISIT: CPT | Performed by: SURGERY

## 2023-10-19 PROCEDURE — 88304 TISSUE EXAM BY PATHOLOGIST: CPT | Performed by: PATHOLOGY

## 2023-10-19 PROCEDURE — 47562 LAPAROSCOPIC CHOLECYSTECTOMY: CPT | Performed by: SURGERY

## 2023-10-19 PROCEDURE — 0FT44ZZ RESECTION OF GALLBLADDER, PERCUTANEOUS ENDOSCOPIC APPROACH: ICD-10-PCS | Performed by: SURGERY

## 2023-10-19 RX ORDER — SUCCINYLCHOLINE/SOD CL,ISO/PF 100 MG/5ML
SYRINGE (ML) INTRAVENOUS AS NEEDED
Status: DISCONTINUED | OUTPATIENT
Start: 2023-10-19 | End: 2023-10-19

## 2023-10-19 RX ORDER — ROCURONIUM BROMIDE 10 MG/ML
INJECTION, SOLUTION INTRAVENOUS AS NEEDED
Status: DISCONTINUED | OUTPATIENT
Start: 2023-10-19 | End: 2023-10-19

## 2023-10-19 RX ORDER — PROPOFOL 10 MG/ML
INJECTION, EMULSION INTRAVENOUS AS NEEDED
Status: DISCONTINUED | OUTPATIENT
Start: 2023-10-19 | End: 2023-10-19

## 2023-10-19 RX ORDER — ONDANSETRON 2 MG/ML
4 INJECTION INTRAMUSCULAR; INTRAVENOUS ONCE AS NEEDED
Status: COMPLETED | OUTPATIENT
Start: 2023-10-19 | End: 2023-10-19

## 2023-10-19 RX ORDER — SODIUM CHLORIDE, SODIUM LACTATE, POTASSIUM CHLORIDE, CALCIUM CHLORIDE 600; 310; 30; 20 MG/100ML; MG/100ML; MG/100ML; MG/100ML
INJECTION, SOLUTION INTRAVENOUS CONTINUOUS PRN
Status: DISCONTINUED | OUTPATIENT
Start: 2023-10-19 | End: 2023-10-19

## 2023-10-19 RX ORDER — METOCLOPRAMIDE HYDROCHLORIDE 5 MG/ML
10 INJECTION INTRAMUSCULAR; INTRAVENOUS EVERY 6 HOURS PRN
Status: DISCONTINUED | OUTPATIENT
Start: 2023-10-19 | End: 2023-10-20 | Stop reason: HOSPADM

## 2023-10-19 RX ORDER — PROPOFOL 10 MG/ML
INJECTION, EMULSION INTRAVENOUS CONTINUOUS PRN
Status: DISCONTINUED | OUTPATIENT
Start: 2023-10-19 | End: 2023-10-19

## 2023-10-19 RX ORDER — BUPIVACAINE HYDROCHLORIDE 5 MG/ML
INJECTION, SOLUTION EPIDURAL; INTRACAUDAL AS NEEDED
Status: DISCONTINUED | OUTPATIENT
Start: 2023-10-19 | End: 2023-10-19 | Stop reason: HOSPADM

## 2023-10-19 RX ORDER — SODIUM CHLORIDE, SODIUM LACTATE, POTASSIUM CHLORIDE, CALCIUM CHLORIDE 600; 310; 30; 20 MG/100ML; MG/100ML; MG/100ML; MG/100ML
50 INJECTION, SOLUTION INTRAVENOUS CONTINUOUS
Status: DISCONTINUED | OUTPATIENT
Start: 2023-10-19 | End: 2023-10-20

## 2023-10-19 RX ORDER — FENTANYL CITRATE/PF 50 MCG/ML
25 SYRINGE (ML) INJECTION
Status: DISCONTINUED | OUTPATIENT
Start: 2023-10-19 | End: 2023-10-19 | Stop reason: HOSPADM

## 2023-10-19 RX ORDER — HYDROMORPHONE HCL/PF 1 MG/ML
SYRINGE (ML) INJECTION AS NEEDED
Status: DISCONTINUED | OUTPATIENT
Start: 2023-10-19 | End: 2023-10-19

## 2023-10-19 RX ORDER — NEOSTIGMINE METHYLSULFATE 1 MG/ML
INJECTION INTRAVENOUS AS NEEDED
Status: DISCONTINUED | OUTPATIENT
Start: 2023-10-19 | End: 2023-10-19

## 2023-10-19 RX ORDER — ONDANSETRON 2 MG/ML
INJECTION INTRAMUSCULAR; INTRAVENOUS AS NEEDED
Status: DISCONTINUED | OUTPATIENT
Start: 2023-10-19 | End: 2023-10-19

## 2023-10-19 RX ORDER — ALBUMIN, HUMAN INJ 5% 5 %
SOLUTION INTRAVENOUS CONTINUOUS PRN
Status: DISCONTINUED | OUTPATIENT
Start: 2023-10-19 | End: 2023-10-19

## 2023-10-19 RX ORDER — MAGNESIUM HYDROXIDE 1200 MG/15ML
LIQUID ORAL AS NEEDED
Status: DISCONTINUED | OUTPATIENT
Start: 2023-10-19 | End: 2023-10-19 | Stop reason: HOSPADM

## 2023-10-19 RX ORDER — HYDROMORPHONE HCL/PF 1 MG/ML
0.4 SYRINGE (ML) INJECTION
Status: DISCONTINUED | OUTPATIENT
Start: 2023-10-19 | End: 2023-10-19 | Stop reason: HOSPADM

## 2023-10-19 RX ORDER — DEXAMETHASONE SODIUM PHOSPHATE 10 MG/ML
INJECTION, SOLUTION INTRAMUSCULAR; INTRAVENOUS AS NEEDED
Status: DISCONTINUED | OUTPATIENT
Start: 2023-10-19 | End: 2023-10-19

## 2023-10-19 RX ORDER — GLYCOPYRROLATE 0.2 MG/ML
INJECTION INTRAMUSCULAR; INTRAVENOUS AS NEEDED
Status: DISCONTINUED | OUTPATIENT
Start: 2023-10-19 | End: 2023-10-19

## 2023-10-19 RX ORDER — FENTANYL CITRATE 50 UG/ML
INJECTION, SOLUTION INTRAMUSCULAR; INTRAVENOUS AS NEEDED
Status: DISCONTINUED | OUTPATIENT
Start: 2023-10-19 | End: 2023-10-19

## 2023-10-19 RX ORDER — LIDOCAINE HYDROCHLORIDE 10 MG/ML
INJECTION, SOLUTION EPIDURAL; INFILTRATION; INTRACAUDAL; PERINEURAL AS NEEDED
Status: DISCONTINUED | OUTPATIENT
Start: 2023-10-19 | End: 2023-10-19

## 2023-10-19 RX ORDER — MIDAZOLAM HYDROCHLORIDE 2 MG/2ML
INJECTION, SOLUTION INTRAMUSCULAR; INTRAVENOUS AS NEEDED
Status: DISCONTINUED | OUTPATIENT
Start: 2023-10-19 | End: 2023-10-19

## 2023-10-19 RX ADMIN — ROCURONIUM BROMIDE 50 MG: 10 INJECTION, SOLUTION INTRAVENOUS at 15:47

## 2023-10-19 RX ADMIN — LIDOCAINE HYDROCHLORIDE 100 MG: 10 INJECTION, SOLUTION EPIDURAL; INFILTRATION; INTRACAUDAL; PERINEURAL at 15:16

## 2023-10-19 RX ADMIN — GLYCOPYRROLATE 0.5 MG: 0.2 INJECTION, SOLUTION INTRAMUSCULAR; INTRAVENOUS at 17:06

## 2023-10-19 RX ADMIN — SODIUM CHLORIDE, SODIUM GLUCONATE, SODIUM ACETATE, POTASSIUM CHLORIDE, MAGNESIUM CHLORIDE, SODIUM PHOSPHATE, DIBASIC, AND POTASSIUM PHOSPHATE 125 ML/HR: .53; .5; .37; .037; .03; .012; .00082 INJECTION, SOLUTION INTRAVENOUS at 20:57

## 2023-10-19 RX ADMIN — ACETAMINOPHEN 650 MG: 325 TABLET, FILM COATED ORAL at 21:08

## 2023-10-19 RX ADMIN — SODIUM CHLORIDE, SODIUM GLUCONATE, SODIUM ACETATE, POTASSIUM CHLORIDE, MAGNESIUM CHLORIDE, SODIUM PHOSPHATE, DIBASIC, AND POTASSIUM PHOSPHATE 125 ML/HR: .53; .5; .37; .037; .03; .012; .00082 INJECTION, SOLUTION INTRAVENOUS at 09:28

## 2023-10-19 RX ADMIN — FENTANYL CITRATE 50 MCG: 50 INJECTION INTRAMUSCULAR; INTRAVENOUS at 15:16

## 2023-10-19 RX ADMIN — ROCURONIUM BROMIDE 10 MG: 10 INJECTION, SOLUTION INTRAVENOUS at 16:18

## 2023-10-19 RX ADMIN — SODIUM CHLORIDE, SODIUM LACTATE, POTASSIUM CHLORIDE, AND CALCIUM CHLORIDE: .6; .31; .03; .02 INJECTION, SOLUTION INTRAVENOUS at 15:11

## 2023-10-19 RX ADMIN — ONDANSETRON 4 MG: 2 INJECTION INTRAMUSCULAR; INTRAVENOUS at 21:08

## 2023-10-19 RX ADMIN — AMITRIPTYLINE HYDROCHLORIDE 10 MG: 10 TABLET, FILM COATED ORAL at 21:07

## 2023-10-19 RX ADMIN — ONDANSETRON 4 MG: 2 INJECTION INTRAMUSCULAR; INTRAVENOUS at 17:31

## 2023-10-19 RX ADMIN — DEXAMETHASONE SODIUM PHOSPHATE 8 MG: 10 INJECTION, SOLUTION INTRAMUSCULAR; INTRAVENOUS at 16:09

## 2023-10-19 RX ADMIN — HEPARIN SODIUM 5000 UNITS: 5000 INJECTION INTRAVENOUS; SUBCUTANEOUS at 00:23

## 2023-10-19 RX ADMIN — ONDANSETRON 4 MG: 2 INJECTION INTRAMUSCULAR; INTRAVENOUS at 09:23

## 2023-10-19 RX ADMIN — NEOSTIGMINE METHYLSULFATE 3.5 MG: 1 INJECTION INTRAVENOUS at 17:06

## 2023-10-19 RX ADMIN — ONDANSETRON 4 MG: 2 INJECTION INTRAMUSCULAR; INTRAVENOUS at 15:09

## 2023-10-19 RX ADMIN — SODIUM CHLORIDE, SODIUM LACTATE, POTASSIUM CHLORIDE, AND CALCIUM CHLORIDE: .6; .31; .03; .02 INJECTION, SOLUTION INTRAVENOUS at 17:09

## 2023-10-19 RX ADMIN — MIDAZOLAM 2 MG: 1 INJECTION INTRAMUSCULAR; INTRAVENOUS at 15:09

## 2023-10-19 RX ADMIN — METOCLOPRAMIDE 10 MG: 5 INJECTION, SOLUTION INTRAMUSCULAR; INTRAVENOUS at 22:33

## 2023-10-19 RX ADMIN — HYDROMORPHONE HYDROCHLORIDE 0.5 MG: 1 INJECTION, SOLUTION INTRAMUSCULAR; INTRAVENOUS; SUBCUTANEOUS at 17:17

## 2023-10-19 RX ADMIN — CLINDAMYCIN PHOSPHATE 600 MG: 600 INJECTION, SOLUTION INTRAVENOUS at 12:51

## 2023-10-19 RX ADMIN — ROCURONIUM BROMIDE 10 MG: 10 INJECTION, SOLUTION INTRAVENOUS at 16:42

## 2023-10-19 RX ADMIN — ALBUMIN (HUMAN): 12.5 INJECTION, SOLUTION INTRAVENOUS at 15:32

## 2023-10-19 RX ADMIN — SODIUM CHLORIDE, SODIUM GLUCONATE, SODIUM ACETATE, POTASSIUM CHLORIDE, MAGNESIUM CHLORIDE, SODIUM PHOSPHATE, DIBASIC, AND POTASSIUM PHOSPHATE 125 ML/HR: .53; .5; .37; .037; .03; .012; .00082 INJECTION, SOLUTION INTRAVENOUS at 00:23

## 2023-10-19 RX ADMIN — HYDROMORPHONE HYDROCHLORIDE 0.5 MG: 1 INJECTION, SOLUTION INTRAMUSCULAR; INTRAVENOUS; SUBCUTANEOUS at 16:45

## 2023-10-19 RX ADMIN — CLINDAMYCIN PHOSPHATE 600 MG: 600 INJECTION, SOLUTION INTRAVENOUS at 05:55

## 2023-10-19 RX ADMIN — FENTANYL CITRATE 50 MCG: 50 INJECTION INTRAMUSCULAR; INTRAVENOUS at 15:32

## 2023-10-19 RX ADMIN — PROPOFOL 20 MCG/KG/MIN: 10 INJECTION, EMULSION INTRAVENOUS at 15:28

## 2023-10-19 RX ADMIN — HEPARIN SODIUM 5000 UNITS: 5000 INJECTION INTRAVENOUS; SUBCUTANEOUS at 21:09

## 2023-10-19 RX ADMIN — Medication 100 MG: at 15:16

## 2023-10-19 RX ADMIN — METOPROLOL SUCCINATE 50 MG: 50 TABLET, EXTENDED RELEASE ORAL at 09:10

## 2023-10-19 RX ADMIN — PROPOFOL 200 MG: 10 INJECTION, EMULSION INTRAVENOUS at 15:16

## 2023-10-19 NOTE — OP NOTE
OPERATIVE REPORT  PATIENT NAME: Leora Ferris    :  1960  MRN: [id number]  Pt Location: [facility] OR ROOM 09    SURGERY DATE: 10/19/2023    Surgeon(s) and Role:     * Alyssia Lucas DO - Primary     * Luis Grimes DO -  [facility], MD - Assisting    Preop Diagnosis:  Cholelithiasis [K80.20]  Acute calculous cholecystitis [K80.00]    Post-Op Diagnosis Codes:     * Cholelithiasis [K80.20]     * Acute calculous cholecystitis [K80.00]    Procedure(s):  CHOLECYSTECTOMY LAPAROSCOPIC    Specimen(s):  ID Type Source Tests Collected by Time Destination   1 :  Tissue Gallbladder TISSUE EXAM Alyssia Lucas DO 10/19/2023 1649        Estimated Blood Loss:   Minimal    Drains:  NG/OG/Enteral Tube Orogastric 18 Fr Center mouth (Active)   Number of days: 0       Anesthesia Type:   General    Operative Indications:  Cholelithiasis [K80.20]  Acute calculous cholecystitis [K80.00]      Operative Findings:  Severely inflamed gallbladder consistent with acute on chronic cholecystitis    Complications:   None    Procedure and Technique:  The patient was met and identified in the preop holding area and identified by name and patient identification bracelet. The patient was placed in the supine position and general anesthesia was induced. After the induction, the abdomen was prepped in the usual sterile fashion. A time-out was called and all parties were in agreement. We insufflated the abdomen by inserting the Veress needle at Palmar's point. Once entry was obtained a water-drop test was performed to ensure adequate placement of the Veress needle. The abdomen was insufflated to 15mmHG. A 5mm trocar was then introduced in the LUQ using the optiview technique. A 5mm port was then placed around the umbilicus under direct visualization. Additionally a 12mm subxiphoid trocar and two additonaly 5mm right sided trocars were placed under direct visualization.   There was no evidence of intra-abdominal injury or bleeding. The patient was placed in reverse Trendelenburg position with right side up. The gallbladder was identified, the fundus grasped and retracted cephalad. The gallbladder was noted to be severely inflamed consistent with acute on chronic cholecystitis. Adhesions from the gallbladder to the duodenum were lysed bluntly and with electrocautery where indicated, taking care not to injure any adjacent organs or viscus. The infundibulum was grasped and retracted laterally, exposing the peritoneum overlying the triangle of Calot. The peritoneum was removed anteriorly and posteriorly to the gallbladder, with special attention to the backside of the gallbladder dissection. There was dense inflammation noted at the cystic plate. This allowed for freeing up the gallbladder. The critical view of the triangle Calot was carried out, dissecting out the cystic duct and cystic artery. The cystic duct was then doubly ligated with surgical clips on the patient side and singly clipped on the gallbladder side and divided. The cystic artery was ligated with clips and divided as well. The gallbladder was dissected from the liver bed in retrograde fashion with electrocautery. The gallbladder was removed, via an Endo bag, through the subxiphoid port. The gallbladder was noted to have a very large stone which required us to extend our subxiphoid incision. The liver bed was irrigated and inspected. Hemostasis was achieved. Fluid in the right upper quadrant and gallbladder fossa which was irrigated and aspirated until the effluent was clear. Pneumoperitoneum was completely reduced after viewing removal of the trocars under direct vision. The subxiphoid fascia was closed with a figure of eight 0 vicryl suture. Dead space of the subxphoid incision was closed with interrupted 3-0 Vicryl suture . All trocar sites were then closed with subcuticular 4-0 monocryl.  Instrument, sponge, and needle counts were correct at closure and confirmed by RF wanding. The patient was extubated and brought to PACU in stable condition. Dr. Toby Espinoza was present for the entire procedure.        Patient Disposition:  PACU         SIGNATURE: Jesse Harrell DO  DATE: October 19, 2023  TIME: 5:09 PM

## 2023-10-19 NOTE — ANESTHESIA PREPROCEDURE EVALUATION
Procedure:  CHOLECYSTECTOMY LAPAROSCOPIC (Abdomen)    Relevant Problems   CARDIO   (+) Hemorrhoids      GI/HEPATIC   (+) Gastro-esophageal reflux disease without esophagitis      Digestive   (+) Calculus of gallbladder   (+) Irritable bowel syndrome with diarrhea      Other   (+) Diverticulitis   (+) Upper abdominal pain      Lab Results   Component Value Date    SODIUM 140 10/18/2023    K 4.2 10/18/2023     10/18/2023    CO2 27 10/18/2023    AGAP 6 10/18/2023    BUN 13 10/18/2023    CREATININE 0.75 10/18/2023    GLUC 128 10/18/2023    CALCIUM 9.9 10/18/2023    AST 13 10/18/2023    ALT 13 10/18/2023    ALKPHOS 99 10/18/2023    TP 7.4 10/18/2023    TBILI 0.44 10/18/2023    EGFR 85 10/18/2023     Lab Results   Component Value Date    WBC 6.72 10/18/2023    HGB 13.2 10/18/2023    HCT 39.1 10/18/2023    MCV 99 (H) 10/18/2023     10/18/2023            Anesthesia Plan  ASA Score- 2     Anesthesia Type- general with ASA Monitors. Additional Monitors:     Airway Plan: ETT. Plan Factors-Exercise tolerance (METS): >4 METS. Chart reviewed. EKG reviewed. Imaging results reviewed. Existing labs reviewed. Patient summary reviewed. Induction- intravenous.     Postoperative Plan-     Informed Consent-

## 2023-10-19 NOTE — UTILIZATION REVIEW
Initial Clinical Review    Admission: Date/Time/Statement:   Admission Orders (From admission, onward)       Ordered        10/18/23 1620  INPATIENT ADMISSION  Once                          Orders Placed This Encounter   Procedures    INPATIENT ADMISSION     Standing Status:   Standing     Number of Occurrences:   1     Order Specific Question:   Level of Care     Answer:   Med Surg [16]     Order Specific Question:   Estimated length of stay     Answer:   More than 2 Midnights     Order Specific Question:   Certification     Answer:   I certify that inpatient services are medically necessary for this patient for a duration of greater than two midnights. See H&P and MD Progress Notes for additional information about the patient's course of treatment. ED Arrival Information       Expected   -    Arrival   10/18/2023 10:40    Acuity   Urgent              Means of arrival   Walk-In    Escorted by   Family Member    Service   Surgery-General    Admission type   Emergency              Arrival complaint   Shoulder pain/HBP             Chief Complaint   Patient presents with    Shoulder Pain     Is having right shoulder pain and abd pain and nausea. has known gallstones and is scheduled for surgery early November. Initial Presentation: 59 y.o. female to ED presents for worsening right upper quadrant and right shoulder pain x1 wk. First episode was reportedly 2 yrs ago, only occurring 4x since then. Pain has been intermittent and the past 6 months, but constant and worsening in the past week. Reports decreased appetite, nausea, episodes of diarrhea, subjective fever, chills, shortness of breath with pain, increased BP above baseline. Seen at [facility] ED yesterday, right upper quadrant Us done and showed cholelithiasis without pericholecystic edema. Pt was prescribed Valium for possible musculoskeletal pain.  He is scheduled for elective cholecystectomy in November at [facility]. In ED today, hypertensive to 1702 over 90s. CT abd/pelvis showed cholelithiasis with likely mild acute cholecystitis   PMH for Cholelithiasis, colectomy 2012 for diverticulitis, hypertension, hyperlipidemia, IBS, migraine, LANETTE. Admit Inpatient level of care for Symptomatic cholelithiasis, Acute cholecystitis. NPO at [facility]. Plan OR for Laparoscopic cholecystectomy on 10/19. Iv antibiotics. Pain and nausea control prn. Date: 10/19   Day 2:   Progress notes; Plan for OR today. NPO since MN. Continue Iv antibiotics and IVFs. Pain and nausea control prn. Pt c/o headache this am. Po intake with some nausea yesterday. Endorses chills overnight.      10/19 S/p OR - CHOLECYSTECTOMY LAPAROSCOPIC   Operative Findings:  Severely inflamed gallbladder consistent with acute on chronic cholecystitis    ED Triage Vitals   Temperature Pulse Respirations Blood Pressure SpO2   10/18/23 1050 10/18/23 1050 10/18/23 1050 10/18/23 1050 10/18/23 1050   97.7 °F (36.5 °C) 68 18 151/97 98 %      Temp Source Heart Rate Source Patient Position - Orthostatic VS BP Location FiO2 (%)   10/18/23 1050 10/18/23 1050 10/18/23 1115 10/18/23 1050 --   Temporal Monitor Lying Right arm       Pain Score       10/18/23 1050       9          Wt Readings from Last 1 Encounters:   No data found for Wt     Additional Vital Signs:   Date/Time Temp Pulse Resp BP MAP (mmHg) SpO2 O2 Device Patient Position - Orthostatic VS   10/19/23 08:38:22 98.2 °F (36.8 °C) 70 -- 152/89 110 94 % -- --   10/18/23 2100 -- -- -- -- -- -- None (Room air) --   10/18/23 18:10:29 98.2 °F (36.8 °C) 65 16 152/90 111 94 % -- --   10/18/23 1800 -- -- -- -- -- -- None (Room air) --   10/18/23 17:37:45 98.1 °F (36.7 °C) 65 16 130/76 94 91 % -- --   10/18/23 1600 -- 65 18 131/65 92 92 % None (Room air) Lying   10/18/23 1430 -- 72 -- 168/79 -- 95 % None (Room air) --   10/18/23 1300 -- 60 18 175/81 Abnormal  116 96 % None (Room air) Lying     Pertinent Labs/Diagnostic Test Results:   CT abdomen pelvis with contrast   Final Result by Jesus Manuel Shipman MD (10/18 7918)      1. Cholelithiasis with likely mild acute cholecystitis given acute right upper quadrant pain and nausea. 2.  Moderate hiatal hernia. 3.  Indeterminate 1.2 cm right adrenal nodule. Although its imaging features are indeterminate, it does not have suspicious imaging features (heterogeneity, necrosis, irregular margins), therefore this is likely benign, and can be followed by    non-contrast abdomen CT or MRI in 12 months. Please note that for adrenal nodule > 1cm,  biochemical evaluation is suggested to rule out functioning adenoma. 4.  2.9 cm cyst in the left ovary with lobulated borders. A follow-up nonemergent ultrasound is recommended given postmenopausal status. Adrenal recommendation based on institutional consensus and Journal of [employer] of Radiology 2017;14:0078-9809      The study was marked in [facility] for immediate notification. Workstation performed: [id number]         XR chest 2 views   Final Result by Tito Vogt MD (10/18 4562)      No acute cardiopulmonary disease.                   Workstation performed: [id number]               Results from last 7 days   Lab Units 10/18/23  1120   WBC Thousand/uL 6.72   HEMOGLOBIN g/dL 13.2   HEMATOCRIT % 39.1   PLATELETS Thousands/uL 200   NEUTROS ABS Thousands/µL 4.85         Results from last 7 days   Lab Units 10/18/23  1120   SODIUM mmol/L 140   POTASSIUM mmol/L 4.2   CHLORIDE mmol/L 107   CO2 mmol/L 27   ANION GAP mmol/L 6   BUN mg/dL 13   CREATININE mg/dL 0.75   EGFR ml/min/1.73sq m 85   CALCIUM mg/dL 9.9     Results from last 7 days   Lab Units 10/18/23  1120   AST U/L 13   ALT U/L 13   ALK PHOS U/L 99   TOTAL PROTEIN g/dL 7.4   ALBUMIN g/dL 4.5   TOTAL BILIRUBIN mg/dL 0.44   BILIRUBIN DIRECT mg/dL 0.07         Results from last 7 days   Lab Units 10/18/23  1120   GLUCOSE RANDOM mg/dL 128       Results from last 7 days   Lab Units 10/18/23  1405 10/18/23  1120   HS TNI 0HR ng/L  --  3   HS TNI 2HR ng/L 2  --    HSTNI D2 ng/L -1  --        Results from last 7 days   Lab Units 10/18/23  1120   LIPASE u/L 31             Results from last 7 days   Lab Units 10/18/23  1214   CLARITY UA  Clear   COLOR UA  Yellow   SPEC GRAV UA  >=1.030   PH UA  6.0   GLUCOSE UA mg/dl Negative   KETONES UA mg/dl Negative   BLOOD UA  Trace*   PROTEIN UA mg/dl Negative   NITRITE UA  Negative   BILIRUBIN UA  Negative   UROBILINOGEN UA E.U./dl 0.2   LEUKOCYTES UA  Trace*   WBC UA /hpf 1-2   RBC UA /hpf None Seen   BACTERIA UA /hpf Occasional   EPITHELIAL CELLS WET PREP /hpf Occasional   MUCUS THREADS  Occasional*       ED Treatment:   Medication Administration from 10/18/2023 1040 to 10/18/2023 1725         Date/Time Order Dose Route Action     10/18/2023 1122 EDT sodium chloride 0.9 % bolus 1,000 mL 1,000 mL Intravenous New Bag     10/18/2023 1131 EDT ketorolac (TORADOL) injection 15 mg 15 mg Intravenous Given     10/18/2023 1135 EDT ondansetron (ZOFRAN) injection 4 mg 4 mg Intravenous Given     10/18/2023 1205 EDT iohexol (OMNIPAQUE) 240 MG/ML solution 50 mL 50 mL Oral Given     10/18/2023 1349 EDT iohexol (OMNIPAQUE) 350 MG/ML injection (SINGLE-DOSE) 100 mL 100 mL Intravenous Given     10/18/2023 1442 EDT HYDROmorphone (DILAUDID) injection 0.5 mg 0.5 mg Intravenous Given     10/18/2023 1506 EDT metoclopramide (REGLAN) injection 10 mg 10 mg Intravenous Given     10/18/2023 1654 EDT heparin (porcine) subcutaneous injection 5,000 Units 5,000 Units Subcutaneous Given          Past Medical History:   Diagnosis Date    Gallstones     High cholesterol     Hypertension     IBS (irritable bowel syndrome)     Migraine     Skin cancer     Sleep apnea      Present on Admission:  **None**      Admitting Diagnosis: Cholelithiasis [K80.20]  Shoulder pain [M25.519]  HBP (high blood pressure) [I10]  Acute calculous cholecystitis [K80.00]  Age/Sex: 59 y.o. female    Admission Orders:  Scheduled Medications:  amitriptyline, 10 mg, Oral, HS  clindamycin, 600 mg, Intravenous, Q8H  escitalopram, 20 mg, Oral, Daily  famotidine, 20 mg, Oral, Daily  heparin (porcine), 5,000 Units, Subcutaneous, Q8H ASHLEY  metoprolol succinate, 50 mg, Oral, Daily      Continuous IV Infusions:  multi-electrolyte, 125 mL/hr, Intravenous, Continuous      PRN Meds:  acetaminophen, 650 mg, Oral, Q6H PRN  HYDROmorphone, 0.5 mg, Intravenous, Q4H PRN  ondansetron, 4 mg, Intravenous, Q6H PRN 10/19 x1  oxyCODONE, 10 mg, Oral, Q4H PRN  oxyCODONE, 5 mg, Oral, Q4H PRN        None    Network Utilization Review Department  ATTENTION: Please call with any questions or concerns to [phone] and carefully listen to the prompts so that you are directed to the right person. All voicemails are confidential.   For Discharge needs, contact Care Management DC Support Team at [phone] opt. 2  Send all requests for admission clinical reviews, approved or denied determinations and any other requests to dedicated fax number below belonging to the campus where the patient is receiving treatment.  List of dedicated fax numbers for the Facilities:  [place] DENIALS (Administrative/Medical Necessity) [phone]   DISCHARGE SUPPORT TEAM (NETWORK) [id number] [address] Pierre (Maternity/NICU/Pediatrics) [phone]   [address] [facility] [phone]   [address] [facility] [address]20 [facility] [address] [address] [facility] [address] [phone]   [address] [place] [address] Street  [facility] [address] [phone]     '

## 2023-10-19 NOTE — ANESTHESIA POSTPROCEDURE EVALUATION
Post-Op Assessment Note    CV Status:  Stable  Pain Score: 0    Pain management: adequate     Mental Status:  Alert and awake   Hydration Status:  Euvolemic   PONV Controlled:  Controlled   Airway Patency:  Patent      Post Op Vitals Reviewed: Yes      Staff: CRNA         No notable events documented.     BP   122/63   Temp      Pulse  67   Resp   22   SpO2   97 4L NC

## 2023-10-19 NOTE — PROGRESS NOTES
Progress Note -Red Surgery  Carmita Healy 59 y.o. female MRN: [id number]  Unit/Bed#: [facility] 911-01 Encounter: [id number]    Assessment:  59-year-old female with symptomatic cholelithiasis, acute cholecystitis    Plan:  - NPO since midnight  - Laparoscopic cholecystectomy, timing pending  - Continue IV antibiotics  - Pain and nausea control PRN  - DVT ppx    Subjective:  Patient reporting headache this morning. P.o. intake yesterday with some nausea, no vomiting. Pain is controlled. Endorses chills overnight but denies fever or shortness of breath. Passing flatus but no bowel movements. Voiding. Objective:    Vitals:   Afebrile, vital stable on room air  Temp:  [97.7 °F (36.5 °C)-98.2 °F (36.8 °C)] 98.2 °F (36.8 °C)  HR:  [60-72] 70  Resp:  [16-18] 16  BP: (130-175)/() 152/89  There is no height or weight on file to calculate BMI. Physical Exam:   Gen: NAD, Resting in bed  Neuro: A&O, No focal deficits  Head: Normal Cephalic, Atraumatic  Eye: EOMI, No scleral icterus  CV: Regular rate  Pulm: Normal work of breathing, No respiratory distress on RA  Abd: Soft, nondistended, tenderness in right upper quadrant  Ext: No edema bilateral lower extremities, Non-tender  Skin: Warm, Dry, Intact    I/O:  Voiding.   No intake or output data in the 24 hours ending 10/19/23 0915    Lab Results:    Recent Labs     10/18/23  1120   WBC 6.72   HGB 13.2      SODIUM 140   K 4.2      CO2 27   BUN 13   CREATININE 0.75   GLUC 128   CALCIUM 9.9   AST 13   ALT 13   ALKPHOS 99   TBILI 0.44       ---    Justice Shock, MD  General Surgery PGY-I

## 2023-10-19 NOTE — CASE MANAGEMENT
Case Management Assessment & Discharge Planning Note    Patient name Maycol Lechuga  Location [facility] 911/[facility] 531-87 MRN [id number]  : 1960 Date 10/19/2023       Current Admission Date: 10/18/2023  Current Admission Diagnosis:Cholelithiasis, Shoulder pain, HBP (high blood pressure), Acute calculous cholecystitis   There are no problems to display for this patient. LOS (days): 1  Geometric Mean LOS (GMLOS) (days):   Days to GMLOS:     OBJECTIVE:    Risk of Unplanned Readmission Score: 6.52         Current admission status: Inpatient       Preferred Pharmacy: No Pharmacies Listed  Primary Care Provider: Jasmin Peterson MD    Primary Insurance: AEALTILIA  Secondary Insurance:     ASSESSMENT:  [place] Proxies    There are no active Health Care Proxies on file. Readmission Root Cause  30 Day Readmission: No    Patient Information  Admitted from[de-identified] Home  Mental Status: Alert  During Assessment patient was accompanied by: Not accompanied during assessment  Assessment information provided by[de-identified] Patient  Primary Caregiver: Self  Support Systems: Self, Spouse/significant other, [address] of Residence: [address] [place] [address] do you live in?: [address] [place] [address] entry access options. Select all that apply.: Stairs  Number of steps to enter home. : 1  Do the steps have railings?: No  Type of Current Residence: [place]  In the last 12 months, was there a time when you were not able to pay the mortgage or rent on time?: No  In the last 12 months, how many places have you lived?: 2  In the last 12 months, was there a time when you did not have a steady place to sleep or slept in a shelter (including now)?: No  Living Arrangements: Lives w/ Spouse/significant other  Is patient a ?: No    Activities of Daily Living Prior to Admission  Functional Status: Independent  Completes ADLs independently?: Yes  Ambulates independently?: Yes  Does patient use assisted devices?: No  Does patient currently own DME?: Yes  What DME does the patient currently own?: [facility]  Does patient have a history of Outpatient Therapy (PT/OT)?: Yes  Does the patient have a history of Short-Term Rehab?: No  Does patient have a history of HHC?: No  Does patient currently have [facility]?: No         Patient Information Continued  Income Source: Pension/[employer]  Does patient have prescription coverage?: Yes  Within the past 12 months, you worried that your food would run out before you got the money to buy more.: Never true  Within the past 12 months, the food you bought just didn't last and you didn't have money to get more.: Never true  Does patient receive dialysis treatments?: No  Does patient have a history of substance abuse?: No  Does patient have a history of Mental Health Diagnosis?: No         Means of Transportation  Means of Transport to Appts[de-identified] Drives Self  In the past 12 months, has lack of transportation kept you from medical appointments or from getting medications?: No  In the past 12 months, has lack of transportation kept you from meetings, work, or from getting things needed for daily living?: No        DISCHARGE DETAILS:    Discharge planning discussed with[de-identified] Patient  Freedom of Choice: Yes     CM contacted family/caregiver?: No- see comments (Pt alert and oriented)  Were Treatment Team discharge recommendations reviewed with patient/caregiver?: Yes  Did patient/caregiver verbalize understanding of patient care needs?: Yes  Were patient/caregiver advised of the risks associated with not following Treatment Team discharge recommendations?: Yes    Contacts  Patient Contacts: Roseline Pederson  Relationship to Patient[de-identified] Family  Contact Method: Phone  Phone Number: [phone]  Reason/Outcome: Emergency [address]         Is the patient interested in [facility] at discharge?: No    DME Referral Provided  Referral made for DME?: No    Other Referral/Resources/Interventions Provided:  Interventions: None Indicated  Referral Comments: No CM needs indicated at this time         Treatment Team Recommendation: Home  Discharge Destination Plan[de-identified] Home                CM met with pt to complete assessment. Pt lives with her  in a 54 and older community. Pt reports she has 1 LINDSAY and bath/bed is on the main level. Pt reports being independent with all ADL's prior to admission. Pt reports she does not use any assistive devices. Pt denies any mental health or drug and alcohol hx. Pt reports driving herself to appointments. At this time no CM needs indicated, will continue to follow for discharge planning needs.

## 2023-10-20 VITALS
HEART RATE: 75 BPM | DIASTOLIC BLOOD PRESSURE: 77 MMHG | SYSTOLIC BLOOD PRESSURE: 141 MMHG | OXYGEN SATURATION: 93 % | RESPIRATION RATE: 15 BRPM | HEIGHT: 65 IN | TEMPERATURE: 98.9 F

## 2023-10-20 LAB
ALBUMIN SERPL BCP-MCNC: 4.1 G/DL (ref 3.5–5)
ALP SERPL-CCNC: 83 U/L (ref 34–104)
ALT SERPL W P-5'-P-CCNC: 38 U/L (ref 7–52)
ANION GAP SERPL CALCULATED.3IONS-SCNC: 8 MMOL/L
AST SERPL W P-5'-P-CCNC: 41 U/L (ref 13–39)
BASOPHILS # BLD AUTO: 0.02 THOUSANDS/ÂΜL (ref 0–0.1)
BASOPHILS NFR BLD AUTO: 0 % (ref 0–1)
BILIRUB SERPL-MCNC: 0.47 MG/DL (ref 0.2–1)
BUN SERPL-MCNC: 9 MG/DL (ref 5–25)
CALCIUM SERPL-MCNC: 9.2 MG/DL (ref 8.4–10.2)
CHLORIDE SERPL-SCNC: 102 MMOL/L (ref 96–108)
CO2 SERPL-SCNC: 27 MMOL/L (ref 21–32)
CREAT SERPL-MCNC: 0.61 MG/DL (ref 0.6–1.3)
EOSINOPHIL # BLD AUTO: 0.01 THOUSAND/ÂΜL (ref 0–0.61)
EOSINOPHIL NFR BLD AUTO: 0 % (ref 0–6)
ERYTHROCYTE [DISTWIDTH] IN BLOOD BY AUTOMATED COUNT: 13.6 % (ref 11.6–15.1)
GFR SERPL CREATININE-BSD FRML MDRD: 97 ML/MIN/1.73SQ M
GLUCOSE SERPL-MCNC: 97 MG/DL (ref 65–140)
HCT VFR BLD AUTO: 34 % (ref 34.8–46.1)
HGB BLD-MCNC: 11.2 G/DL (ref 11.5–15.4)
IMM GRANULOCYTES # BLD AUTO: 0.03 THOUSAND/UL (ref 0–0.2)
IMM GRANULOCYTES NFR BLD AUTO: 0 % (ref 0–2)
LYMPHOCYTES # BLD AUTO: 1.11 THOUSANDS/ÂΜL (ref 0.6–4.47)
LYMPHOCYTES NFR BLD AUTO: 11 % (ref 14–44)
MCH RBC QN AUTO: 32.1 PG (ref 26.8–34.3)
MCHC RBC AUTO-ENTMCNC: 32.9 G/DL (ref 31.4–37.4)
MCV RBC AUTO: 97 FL (ref 82–98)
MONOCYTES # BLD AUTO: 0.44 THOUSAND/ÂΜL (ref 0.17–1.22)
MONOCYTES NFR BLD AUTO: 4 % (ref 4–12)
NEUTROPHILS # BLD AUTO: 8.28 THOUSANDS/ÂΜL (ref 1.85–7.62)
NEUTS SEG NFR BLD AUTO: 85 % (ref 43–75)
NRBC BLD AUTO-RTO: 0 /100 WBCS
PLATELET # BLD AUTO: 190 THOUSANDS/UL (ref 149–390)
PMV BLD AUTO: 11.4 FL (ref 8.9–12.7)
POTASSIUM SERPL-SCNC: 3.6 MMOL/L (ref 3.5–5.3)
PROT SERPL-MCNC: 6.4 G/DL (ref 6.4–8.4)
RBC # BLD AUTO: 3.49 MILLION/UL (ref 3.81–5.12)
SODIUM SERPL-SCNC: 137 MMOL/L (ref 135–147)
WBC # BLD AUTO: 9.89 THOUSAND/UL (ref 4.31–10.16)

## 2023-10-20 PROCEDURE — 99239 HOSP IP/OBS DSCHRG MGMT >30: CPT | Performed by: STUDENT IN AN ORGANIZED HEALTH CARE EDUCATION/TRAINING PROGRAM

## 2023-10-20 PROCEDURE — 80053 COMPREHEN METABOLIC PANEL: CPT | Performed by: SURGERY

## 2023-10-20 PROCEDURE — NC001 PR NO CHARGE: Performed by: STUDENT IN AN ORGANIZED HEALTH CARE EDUCATION/TRAINING PROGRAM

## 2023-10-20 PROCEDURE — 85025 COMPLETE CBC W/AUTO DIFF WBC: CPT | Performed by: SURGERY

## 2023-10-20 RX ORDER — OXYCODONE HYDROCHLORIDE 5 MG/1
5 TABLET ORAL EVERY 4 HOURS PRN
Qty: 18 TABLET | Refills: 0 | Status: SHIPPED | OUTPATIENT
Start: 2023-10-20 | End: 2023-10-30

## 2023-10-20 RX ORDER — KETOROLAC TROMETHAMINE 30 MG/ML
15 INJECTION, SOLUTION INTRAMUSCULAR; INTRAVENOUS ONCE
Status: COMPLETED | OUTPATIENT
Start: 2023-10-20 | End: 2023-10-20

## 2023-10-20 RX ORDER — SCOLOPAMINE TRANSDERMAL SYSTEM 1 MG/1
1 PATCH, EXTENDED RELEASE TRANSDERMAL
Status: DISCONTINUED | OUTPATIENT
Start: 2023-10-20 | End: 2023-10-20 | Stop reason: HOSPADM

## 2023-10-20 RX ORDER — ONDANSETRON 2 MG/ML
4 INJECTION INTRAMUSCULAR; INTRAVENOUS EVERY 4 HOURS
Status: DISCONTINUED | OUTPATIENT
Start: 2023-10-20 | End: 2023-10-20 | Stop reason: HOSPADM

## 2023-10-20 RX ORDER — PANTOPRAZOLE SODIUM 40 MG/1
40 TABLET, DELAYED RELEASE ORAL
Status: DISCONTINUED | OUTPATIENT
Start: 2023-10-20 | End: 2023-10-20 | Stop reason: HOSPADM

## 2023-10-20 RX ORDER — ONDANSETRON 4 MG/1
4 TABLET, ORALLY DISINTEGRATING ORAL EVERY 6 HOURS PRN
Qty: 8 TABLET | Refills: 0 | Status: SHIPPED | OUTPATIENT
Start: 2023-10-20

## 2023-10-20 RX ADMIN — METOPROLOL SUCCINATE 50 MG: 50 TABLET, EXTENDED RELEASE ORAL at 08:24

## 2023-10-20 RX ADMIN — FAMOTIDINE 20 MG: 20 TABLET, FILM COATED ORAL at 08:24

## 2023-10-20 RX ADMIN — HEPARIN SODIUM 5000 UNITS: 5000 INJECTION INTRAVENOUS; SUBCUTANEOUS at 13:17

## 2023-10-20 RX ADMIN — HEPARIN SODIUM 5000 UNITS: 5000 INJECTION INTRAVENOUS; SUBCUTANEOUS at 06:38

## 2023-10-20 RX ADMIN — ONDANSETRON 4 MG: 2 INJECTION INTRAMUSCULAR; INTRAVENOUS at 12:19

## 2023-10-20 RX ADMIN — ONDANSETRON 4 MG: 2 INJECTION INTRAMUSCULAR; INTRAVENOUS at 08:24

## 2023-10-20 RX ADMIN — PANTOPRAZOLE SODIUM 40 MG: 40 TABLET, DELAYED RELEASE ORAL at 13:17

## 2023-10-20 RX ADMIN — KETOROLAC TROMETHAMINE 15 MG: 30 INJECTION, SOLUTION INTRAMUSCULAR; INTRAVENOUS at 00:26

## 2023-10-20 RX ADMIN — SCOPALAMINE 1 PATCH: 1 PATCH, EXTENDED RELEASE TRANSDERMAL at 10:01

## 2023-10-20 RX ADMIN — ONDANSETRON 4 MG: 2 INJECTION INTRAMUSCULAR; INTRAVENOUS at 15:31

## 2023-10-20 RX ADMIN — KETOROLAC TROMETHAMINE 15 MG: 30 INJECTION, SOLUTION INTRAMUSCULAR; INTRAVENOUS at 08:24

## 2023-10-20 RX ADMIN — SODIUM CHLORIDE, SODIUM GLUCONATE, SODIUM ACETATE, POTASSIUM CHLORIDE, MAGNESIUM CHLORIDE, SODIUM PHOSPHATE, DIBASIC, AND POTASSIUM PHOSPHATE 125 ML/HR: .53; .5; .37; .037; .03; .012; .00082 INJECTION, SOLUTION INTRAVENOUS at 06:37

## 2023-10-20 NOTE — PROGRESS NOTES
Progress Note - Red Surgery  Carmita Potter 59 y.o. female MRN: [id number]  Unit/Bed#: [facility] 911-01 Encounter: [id number]    Assessment:  61-year-old female with symptomatic cholelithiasis, acute on chronic cholecystitis. S/p laparoscopic cholecystectomy 10/19. Plan:  - Low fat diet  - Nausea control, scheduled zofran and PRNs  - Pain control PRN  - DVT ppx    Subjective:  Patient reporting nausea with vomiting. Continues to have headache. Some PO intake. Pain controlled. Passing flatus, no BM. Voiding. No SOB. Objective:    Vitals:   Afebrile, vital stable on room air  Temp:  [97.4 °F (36.3 °C)-99 °F (37.2 °C)] 98.9 °F (37.2 °C)  HR:  [68-82] 75  Resp:  [13-20] 15  BP: (115-151)/(60-92) 141/77  There is no height or weight on file to calculate BMI. Physical Exam:   Gen: NAD, Resting in bed  Neuro: A&O, No focal deficits  Head: Normal Cephalic, Atraumatic  Eye: EOMI, No scleral icterus  CV: Regular rate  Pulm: Normal work of breathing, No respiratory distress on RA  Abd: Soft, nondistended, appropriately tender.  Incisions cdi  Ext: No edema bilateral lower extremities, Non-tender  Skin: Warm, Dry, Intact    I/O:  Voiding; unrecorded    Intake/Output Summary (Last 24 hours) at 10/20/2023 0859  Last data filed at 10/19/2023 1709  Gross per 24 hour   Intake 2385.42 ml   Output --   Net 2385.42 ml       Lab Results:    Recent Labs     10/18/23  1120 10/20/23  0459   WBC 6.72 9.89   HGB 13.2 11.2*    190   SODIUM 140 137   K 4.2 3.6    102   CO2 27 27   BUN 13 9   CREATININE 0.75 0.61   GLUC 128 97   CALCIUM 9.9 9.2   AST 13 41*   ALT 13 38   ALKPHOS 99 83   TBILI 0.44 0.47         ---    Charlett Ahumada, MD  General Surgery PGY-I

## 2023-10-20 NOTE — DISCHARGE INSTR - AVS FIRST PAGE
Acute Care Surgery Discharge Instructions    Please follow-up as instructed. If you do not already have a follow-up appointment, please call the office when you leave to schedule an appointment to be seen in 2-3 weeks for post-operative re-evaluation. Activity:  - No lifting greater than 20 pounds or strenuous physical activity or exercise for 2 weeks. - Walking and normal light activities are encouraged. - Normal daily activities including climbing steps are okay. - No driving until no longer using pain medications. Return to work:    - You may return to work in 2 weeks or sooner if you are feeling well enough. Diet:    - You may resume your normal diet. Wound Care:  - May shower daily. No tub baths or swimming until cleared by your surgeon.  - Wash incision gently with soap and water and pat dry. - Do not apply any creams or ointments unless instructed to do so by your surgeon.  - Brittany Jessica may apply ice as needed (no longer than 20 minutes at a time) for the first 48 hours. - Bruising is not unusual and will go away with a little time. You may apply a warm, moist compress that may help the bruising resolve quicker. - You may remove the dressings the day after surgery (unless otherwise instructed). Leave any skin tapes (steri-strips) on the incision(s) in place until they fall off on their own. Any new dressings are optional.    Medications:    - You may resume all of your regular medications after discharge unless otherwise instructed. Please refer to your discharge medication list for further details. - Please take the pain medications as directed. - You are encouraged to use non-narcotic pain medications first and whenever possible. Reserve the use of narcotic pain medication for moderate to severe pain not controlled by non-narcotic medications.  - No driving while taking narcotic pain medications. - You may become constipated, especially if taking pain medications.  You may take any over the counter stool softeners or laxatives as needed. Examples: Milk of Magnesia, Colace, Senna. Additional Instructions:  - If you have any questions or concerns after discharge please call the office.  - Call office or return to ER if fever greater than 101, chills, persistent nausea/vomiting, worsening/uncontrollable pain, and/or increasing redness or purulent/foul smelling drainage from incision(s).

## 2023-10-20 NOTE — DISCHARGE SUMMARY
Discharge Summary - Derrick Whittaker 59 y.o. female MRN: [id number]    Unit/Bed#: [facility] 911-01 Encounter: [id number]    Admission Date:   Admission Orders (From admission, onward)       Ordered        10/18/23 1620  INPATIENT ADMISSION  Once                            Admitting Diagnosis: Cholelithiasis [K80.20]  Shoulder pain [M25.519]  HBP (high blood pressure) [I10]  Acute calculous cholecystitis [K80.00]    HPI: Per Cate Beaulieu, "Derrick Whittaker is a 59 y.o. female with a history of known cholelithiasis, colectomy 2012 for diverticulitis, hypertension, hyperlipidemia, IBS, migraine, LANETTE. Patient scheduled for elective cholecystectomy in November at [facility]. Presented to the ED after 1 week of worsening right upper quadrant and right shoulder pain. First episode was reportedly 2 years ago, only occurring 4 times since then. Pain has been intermittent and the past 6 months, but constant and worsening in the past week. Reports decreased appetite, nausea, episodes of diarrhea, subjective fever, chills, shortness of breath with pain, increased blood pressure above baseline. Denies chest pain or vomiting. Seen yesterday at [facility] ED, at which time right upper quadrant ultrasound was performed, which demonstrated cholelithiasis without pericholecystic edema. Patient was prescribed Valium for possible musculoskeletal pain. On presentation to ED today, hypertensive to 170s over 90s, vitals otherwise within normal limits. Labs unremarkable. CT abdomen pelvis performed demonstrating cholelithiasis with likely mild acute cholecystitis."    Procedures Performed:   Orders Placed This Encounter   Procedures    ED ECG Documentation Only       Summary of Hospital Course: Patient is a 81-year-old female who comes in for symptomatic cholelithiasis. Patient was taken to the OR on 10/19/2023 for laparoscopic cholecystectomy. The patient did well in the postoperative setting.   She was able to tolerate a diet.  She was ultimately discharged home on 10/20/2023. She would have outpatient follow-up with surgical team in 2 weeks. Discharge instructions and incidental findings were discussed at bedside prior to discharge. She was agreeable to discharge plan. Significant Findings, Care, Treatment and Services Provided:   XR chest 2 views    Result Date: 10/18/2023  Impression: No acute cardiopulmonary disease. Workstation performed: [id number]     CT abdomen pelvis with contrast    Result Date: 10/18/2023  Impression: 1. Cholelithiasis with likely mild acute cholecystitis given acute right upper quadrant pain and nausea. 2.  Moderate hiatal hernia. 3.  Indeterminate 1.2 cm right adrenal nodule. Although its imaging features are indeterminate, it does not have suspicious imaging features (heterogeneity, necrosis, irregular margins), therefore this is likely benign, and can be followed by non-contrast abdomen CT or MRI in 12 months. Please note that for adrenal nodule > 1cm,  biochemical evaluation is suggested to rule out functioning adenoma. 4.  2.9 cm cyst in the left ovary with lobulated borders. A follow-up nonemergent ultrasound is recommended given postmenopausal status. Adrenal recommendation based on institutional consensus and [employer] of [employer] of Radiology 2017;14:3544-1169 The study was marked in [facility] for immediate notification.  Workstation performed: [id number]        Complications: no complications    Discharge Diagnosis:   Patient Active Problem List   Diagnosis    Calculus of gallbladder    Diverticulitis    Gastro-esophageal reflux disease without esophagitis    Hemorrhoids    Irritable bowel syndrome with diarrhea    Upper abdominal pain    History of colonic polyps         Medical Problems       Resolved Problems  Date Reviewed: 10/18/2023   None         Condition at Discharge: good         Discharge instructions/Information to patient and family:   See after visit summary for information provided to patient and family. Provisions for Follow-Up Care:  See after visit summary for information related to follow-up care and any pertinent home health orders. PCP: Patricia Lopez MD    Disposition: Home    Planned Readmission: No      Discharge Statement   I spent 23 minutes discharging the patient. This time was spent on the day of discharge. I had direct contact with the patient on the day of discharge. Additional documentation is required if more than 30 minutes were spent on discharge. Discharge Medications:  See after visit summary for reconciled discharge medications provided to patient and family.

## 2023-10-20 NOTE — PLAN OF CARE
Problem: PAIN - ADULT  Goal: Verbalizes/displays adequate comfort level or baseline comfort level  Description: Interventions:  - Encourage patient to monitor pain and request assistance  - Assess pain using appropriate pain scale  - Administer analgesics based on type and severity of pain and evaluate response  - Implement non-pharmacological measures as appropriate and evaluate response  - Consider cultural and social influences on pain and pain management  - Notify physician/advanced practitioner if interventions unsuccessful or patient reports new pain  10/20/2023 1547 by Joshua Powers RN  Outcome: Completed  10/20/2023 1107 by Joshua Powers RN  Outcome: Progressing     Problem: INFECTION - ADULT  Goal: Absence or prevention of progression during hospitalization  Description: INTERVENTIONS:  - Assess and monitor for signs and symptoms of infection  - Monitor lab/diagnostic results  - Monitor all insertion sites, i.e. indwelling lines, tubes, and drains  - Monitor endotracheal if appropriate and nasal secretions for changes in amount and color  - [place] appropriate cooling/warming therapies per order  - Administer medications as ordered  - Instruct and encourage patient and family to use good hand hygiene technique  - Identify and instruct in appropriate isolation precautions for identified infection/condition  10/20/2023 1547 by Joshua Powers RN  Outcome: Completed  10/20/2023 1107 by Joshua Powers RN  Outcome: Progressing  Goal: Absence of fever/infection during neutropenic period  Description: INTERVENTIONS:  - Monitor WBC    10/20/2023 1547 by Joshua Powers RN  Outcome: Completed  10/20/2023 1107 by Joshua Powers RN  Outcome: Progressing     Problem: SAFETY ADULT  Goal: Patient will remain free of falls  Description: INTERVENTIONS:  - Educate patient/family on patient safety including physical limitations  - Instruct patient to call for assistance with activity   - Consult OT/PT to assist with strengthening/mobility - Keep Call bell within reach  - Keep bed low and locked with side rails adjusted as appropriate  - Keep care items and personal belongings within reach  - Initiate and maintain comfort rounds  - Make Fall Risk Sign visible to staff  - Offer Toileting every  Hours, in advance of need  - Initiate/Maintain alarm  - Obtain necessary fall risk management equipment:   - Apply yellow socks and bracelet for high fall risk patients  - Consider moving patient to room near nurses station  10/20/2023 1547 by Marguerite Leigh RN  Outcome: Completed  10/20/2023 1107 by Marguerite Leigh RN  Outcome: Progressing  Goal: Maintain or return to baseline ADL function  Description: INTERVENTIONS:  -  Assess patient's ability to carry out ADLs; assess patient's baseline for ADL function and identify physical deficits which impact ability to perform ADLs (bathing, care of mouth/teeth, toileting, grooming, dressing, etc.)  - Assess/evaluate cause of self-care deficits   - Assess range of motion  - Assess patient's mobility; develop plan if impaired  - Assess patient's need for assistive devices and provide as appropriate  - Encourage maximum independence but intervene and supervise when necessary  - Involve family in performance of ADLs  - Assess for home care needs following discharge   - Consider OT consult to assist with ADL evaluation and planning for discharge  - Provide patient education as appropriate  10/20/2023 1547 by Marguerite Leigh RN  Outcome: Completed  10/20/2023 1107 by Marguerite Leigh RN  Outcome: Progressing  Goal: Maintains/Returns to pre admission functional level  Description: INTERVENTIONS:  - Perform BMAT or MOVE assessment daily.   - Set and communicate daily mobility goal to care team and patient/family/caregiver. - Collaborate with rehabilitation services on mobility goals if consulted  - Perform Range of Motion  times a day. - Reposition patient every  hours.   - Dangle patient  times a day  - Stand patient  times a day  - Ambulate patient  times a day  - Out of bed to chair  times a day   - Out of bed for meals  times a day  - Out of bed for toileting  - Record patient progress and toleration of activity level   10/20/2023 1547 by Augustina Sawant RN  Outcome: Completed  10/20/2023 1107 by Augustina Sawant RN  Outcome: Progressing     Problem: DISCHARGE PLANNING  Goal: Discharge to home or other facility with appropriate resources  Description: INTERVENTIONS:  - Identify barriers to discharge w/patient and caregiver  - Arrange for needed discharge resources and transportation as appropriate  - Identify discharge learning needs (meds, wound care, etc.)  - Arrange for interpretive services to assist at discharge as needed  - Refer to Case Management Department for coordinating discharge planning if the patient needs post-hospital services based on physician/advanced practitioner order or complex needs related to functional status, cognitive ability, or social support system  10/20/2023 1547 by Augustina Sawant RN  Outcome: Completed  10/20/2023 1107 by Augustina Sawant RN  Outcome: Progressing     Problem: Knowledge Deficit  Goal: Patient/family/caregiver demonstrates understanding of disease process, treatment plan, medications, and discharge instructions  Description: Complete learning assessment and assess knowledge base.   Interventions:  - Provide teaching at level of understanding  - Provide teaching via preferred learning methods  10/20/2023 1547 by Augustina Sawant RN  Outcome: Completed  10/20/2023 1107 by Augustina Sawant RN  Outcome: Progressing

## 2023-10-20 NOTE — QUICK NOTE
Post Op Check:    59 y.o. female now POD 0 s/p laparoscopic cholecystectomy. The patient denied any nausea, vomiting, chest pain, shortness of breath, fevers, chills. Overall, she has no complaints. Has not had a bowel movements and is not yet passing flatus. Yet to void on her own. Vitals:    10/19/23 1930   BP: 151/92   Pulse: 82   Resp: 16   Temp: 99 °F (37.2 °C)   SpO2: 92%       General: NAD  HENT: NCAT MMM  Neck: supple, no JVD  CV: nl rate  Lungs: nl wob. No resp distress  ABD: Soft, appropriately tender, non-distended. Incision sites clean/dry/intact   Extrem: No CCE  Neuro: AAOx3     Plan:  Diet GI; Lo Fat  Continue IV fluids.    Will follow up labs in AM  Pain and nausea control PRN  Continue incentive spirometry   Out of bed and ambulating   DVT prophylaxis     [id number]  [address]

## 2023-10-20 NOTE — INCIDENTAL FINDINGS
The following findings require follow up:  Radiographic finding   Finding:   LOWER CHEST: Moderate hiatal hernia noted. No other clinically significant abnormality identified in the visualized lower chest.     2.  Indeterminate 1.2 cm right adrenal nodule. Although its imaging features are indeterminate, it does not have suspicious imaging features (heterogeneity, necrosis, irregular margins), therefore this is likely benign, and can be followed by non-contrast abdomen CT or MRI in 12 months. Please note that for adrenal nodule > 1cm,  biochemical evaluation is suggested to rule out functioning adenoma. 3. 2.9 cm cyst in the left ovary with lobulated borders. A follow-up nonemergent ultrasound is recommended given postmenopausal status. 4. Small bilateral renal cysts. No hydronephrosis. 5. There is a small fat-containing umbilical hernia. Follow up required: Yes   Follow up should be done within 2-4 week(s)    Please notify the following clinician to assist with the follow up:   Primary Care Provider. Discussed with both patient and patient family at bedside. They expressed verbal understanding of the above findings.

## 2023-10-23 NOTE — UTILIZATION REVIEW
NOTIFICATION OF ADMISSION DISCHARGE   This is a Notification of Discharge from [address]. Please be advised that this patient has been discharge from our facility. Below you will find the admission and discharge date and time including the patient’s disposition. UTILIZATION REVIEW CONTACT:  Jeffery Bell  Utilization   Network Utilization Review Department  Phone: [phone] x carefully listen to the prompts. All voicemails are confidential.  Email: [email or web address]     ADMISSION INFORMATION  PRESENTATION DATE: 10/18/2023 10:50 AM  OBERVATION ADMISSION DATE:   INPATIENT ADMISSION DATE: 10/18/23  4:20 PM   DISCHARGE DATE: 10/20/2023  3:52 PM   DISPOSITION:Home/Self Care    Network Utilization Review Department  ATTENTION: Please call with any questions or concerns to [phone] and carefully listen to the prompts so that you are directed to the right person. All voicemails are confidential.   For Discharge needs, contact Care Management DC Support Team at [phone] opt. 2  Send all requests for admission clinical reviews, approved or denied determinations and any other requests to dedicated fax number below belonging to the campus where the patient is receiving treatment.  List of dedicated fax numbers for the Facilities:  [place] DENIALS (Administrative/Medical Necessity) [phone]   DISCHARGE SUPPORT TEAM (Network) [phone] [facility] (M[facility]Pediatrics) [phone] [address] [facility] [address] [place] [address] [phone]   [facility] [phone]   [address] [place] [address]  [facility] [phone]

## 2023-11-02 ENCOUNTER — OFFICE VISIT (OUTPATIENT)
Dept: SURGERY | Facility: CLINIC | Age: 63
End: 2023-11-02

## 2023-11-02 VITALS
SYSTOLIC BLOOD PRESSURE: 124 MMHG | RESPIRATION RATE: 12 BRPM | TEMPERATURE: 97.6 F | WEIGHT: 201.2 LBS | BODY MASS INDEX: 33.52 KG/M2 | HEART RATE: 65 BPM | HEIGHT: 65 IN | OXYGEN SATURATION: 98 % | DIASTOLIC BLOOD PRESSURE: 75 MMHG

## 2023-11-02 DIAGNOSIS — Z90.49 S/P LAPAROSCOPIC CHOLECYSTECTOMY: Primary | ICD-10-CM

## 2023-11-02 PROCEDURE — 99024 POSTOP FOLLOW-UP VISIT: CPT | Performed by: SURGERY

## 2023-11-02 NOTE — ASSESSMENT & PLAN NOTE
Doing well postoperatively. Path reviewed and results discussed with the patient. She can slowly increase activity as tolerated, but no heavy lifting >20 lbs for a total of 6 weeks after surgery. Diet as tolerated. Follow-up as needed.

## 2023-11-02 NOTE — PATIENT INSTRUCTIONS
You are doing well postoperatively. You can slowly increase activity as tolerated but no heavy lifting greater than 20 pounds for a total of 6 weeks after surgery. Diet as tolerated. Follow-up as needed.

## 2023-11-02 NOTE — PROGRESS NOTES
Office Visit - General Surgery  Salima Benitez MRN: [id number]  Encounter: [id number]    Assessment and Plan  Problem List Items Addressed This Visit        Other    S/P laparoscopic cholecystectomy - Primary     Doing well postoperatively. Path reviewed and results discussed with the patient. She can slowly increase activity as tolerated, but no heavy lifting >20 lbs for a total of 6 weeks after surgery. Diet as tolerated. Follow-up as needed. Chief Complaint:  Salima Benitez is a 59 y.o. female who presents for Post-op (P/o lap ivan.)    Subjective  80-year-old female recently hospitalized with acute on chronic cholecystitis from 10/18-10/20 s/p lap ivan by Dr. Kaveh Lay on 10/19. She presents for follow-up. She states she is doing okay and slowly recovering. She still gets intermittent shoulder pain that radiates to the middle of her back. She also reports one episode of severe RUQ pain with associated chills, which self resolved after an hour. She otherwise has been doing okay and denies fevers or chills. She has been tolerating diet with normal bowel function. Objective  Vitals:    11/02/23 1014   BP: 124/75   Pulse: 65   Resp: 12   Temp: 97.6 °F (36.4 °C)   SpO2: 98%       Physical Exam  On exam she is in no acute distress, she is AOx4  Her abdomen is soft, non-distended, and appropriately tender. She has  four incisions with Exofin glue still in place, clean dry and intact. No erythema or drainage.     Path:   -Cholelithiasis  -Chronic cholecystitis

## 2025-05-21 ENCOUNTER — TELEPHONE (OUTPATIENT)
Age: 65
End: 2025-05-21

## 2025-05-21 ENCOUNTER — TELEPHONE (OUTPATIENT)
Dept: GASTROENTEROLOGY | Facility: CLINIC | Age: 65
End: 2025-05-21

## 2025-05-21 NOTE — TELEPHONE ENCOUNTER
Spoke to patient, informed her we did not have paper work to send out to a new patient prior to your first visit. Told her she could have her medical records sent to use in advance of her apt.

## (undated) DEVICE — SYRINGE 10ML LL

## (undated) DEVICE — INTENDED FOR TISSUE SEPARATION, AND OTHER PROCEDURES THAT REQUIRE A SHARP SURGICAL BLADE TO PUNCTURE OR CUT.: Brand: BARD-PARKER SAFETY BLADES SIZE 15, STERILE

## (undated) DEVICE — TROCAR: Brand: KII FIOS FIRST ENTRY

## (undated) DEVICE — Device: Brand: OMNICLOSE TROCAR SITE CLOSURE DEVICE

## (undated) DEVICE — LIGAMAX 5 MM ENDOSCOPIC MULTIPLE CLIP APPLIER: Brand: LIGAMAX

## (undated) DEVICE — SUT VICRYL PLUS 0 UR-6 27IN VCP603H

## (undated) DEVICE — ELECTRODE LAP L WIRE E-Z CLEAN 33CM -0100

## (undated) DEVICE — GLOVE INDICATOR PI UNDERGLOVE SZ 7.5 BLUE

## (undated) DEVICE — TROCAR: Brand: KII® SLEEVE

## (undated) DEVICE — SUT MONOCRYL 4-0 PS-2 18 IN Y496G

## (undated) DEVICE — GLOVE SRG BIOGEL 7

## (undated) DEVICE — PACK PBDS LAP CHOLE RF

## (undated) DEVICE — NEEDLE 25G X 1 1/2

## (undated) DEVICE — INSUFFLATION NEEDLE TO ESTABLISH PNEUMOPERITONEUM.: Brand: INSUFFLATION NEEDLE

## (undated) DEVICE — ANTIBACTERIAL UNDYED BRAIDED (POLYGLACTIN 910), SYNTHETIC ABSORBABLE SUTURE: Brand: COATED VICRYL

## (undated) DEVICE — PENCIL ELECTROSURG E-Z CLEAN -0035H

## (undated) DEVICE — ADHESIVE SKIN HIGH VISCOSITY EXOFIN 1ML

## (undated) DEVICE — TUBING SMOKE EVAC W/FILTRATION DEVICE PLUMEPORT ACTIV